# Patient Record
Sex: MALE | Race: WHITE | NOT HISPANIC OR LATINO | Employment: OTHER | URBAN - METROPOLITAN AREA
[De-identification: names, ages, dates, MRNs, and addresses within clinical notes are randomized per-mention and may not be internally consistent; named-entity substitution may affect disease eponyms.]

---

## 2017-12-23 ENCOUNTER — HOSPITAL ENCOUNTER (EMERGENCY)
Facility: HOSPITAL | Age: 77
Discharge: HOME | End: 2017-12-23
Attending: SURGERY
Payer: MEDICARE

## 2017-12-23 VITALS
HEIGHT: 72 IN | OXYGEN SATURATION: 97 % | TEMPERATURE: 96 F | BODY MASS INDEX: 35.21 KG/M2 | DIASTOLIC BLOOD PRESSURE: 82 MMHG | WEIGHT: 260 LBS | HEART RATE: 70 BPM | SYSTOLIC BLOOD PRESSURE: 141 MMHG | RESPIRATION RATE: 16 BRPM

## 2017-12-23 DIAGNOSIS — S82.042A CLOSED DISPLACED COMMINUTED FRACTURE OF LEFT PATELLA, INITIAL ENCOUNTER: Primary | ICD-10-CM

## 2017-12-23 PROCEDURE — 96372 THER/PROPH/DIAG INJ SC/IM: CPT

## 2017-12-23 PROCEDURE — 29505 APPLICATION LONG LEG SPLINT: CPT | Mod: LT

## 2017-12-23 PROCEDURE — 99283 EMERGENCY DEPT VISIT LOW MDM: CPT | Mod: 25

## 2017-12-23 PROCEDURE — 63600175 PHARM REV CODE 636 W HCPCS: Performed by: SURGERY

## 2017-12-23 RX ORDER — HYDROMORPHONE HYDROCHLORIDE 2 MG/ML
1 INJECTION, SOLUTION INTRAMUSCULAR; INTRAVENOUS; SUBCUTANEOUS
Status: COMPLETED | OUTPATIENT
Start: 2017-12-23 | End: 2017-12-23

## 2017-12-23 RX ORDER — HYDROCODONE BITARTRATE AND ACETAMINOPHEN 10; 325 MG/1; MG/1
1 TABLET ORAL EVERY 6 HOURS PRN
Qty: 20 TABLET | Refills: 0 | Status: SHIPPED | OUTPATIENT
Start: 2017-12-23 | End: 2018-01-02

## 2017-12-23 RX ORDER — ONDANSETRON 2 MG/ML
4 INJECTION INTRAMUSCULAR; INTRAVENOUS
Status: COMPLETED | OUTPATIENT
Start: 2017-12-23 | End: 2017-12-23

## 2017-12-23 RX ADMIN — ONDANSETRON 4 MG: 2 INJECTION INTRAMUSCULAR; INTRAVENOUS at 05:12

## 2017-12-23 RX ADMIN — HYDROMORPHONE HYDROCHLORIDE 1 MG: 2 INJECTION INTRAMUSCULAR; INTRAVENOUS; SUBCUTANEOUS at 05:12

## 2017-12-24 NOTE — ED PROVIDER NOTES
[facility]                                       December 23, 2017                   Chief Complaint  77 y.o. male with Leg Pain (left knee pain after fall)    History of Present Illness  Carl Hu presents to the emergency room with left knee pain noted today  Patient had a slip and fall earlier today with residual left knee pain afterwards  Patient on exam has bruising on the left knee area, pain on patellar exam now  X-ray shows a patella fracture, comminuted, good range of motion on ER exam   Patient has good distal pulses and capillary refill, neurovascular intact in the ER  Pt was extensively counseled and will follow up with an orthopedic, Colton Faust    The history is provided by the patient  Medical history: Arthritis, BPH, CAD, cancer, cataracts, COY, HLD, HTN  Surgeries: Heart catheterization, angioplasty, cosmetic surgical eye surgery  ALLERGIES: Statins    Review of Systems and Physical Exam     Review of Systems  -- Constitution - no fever, denies fatigue, no weakness, no chills  -- Eyes - no tearing or redness, no visual disturbance  -- Ear, Nose - no tinnitus or earache, no nasal congestion or discharge  -- Mouth,Throat - no sore throat, no toothache, normal voice, normal swallowing  -- Respiratory - denies cough and congestion, no shortness of breath, no COY  -- Cardiovascular - denies chest pain, no palpitations, denies claudication  -- Gastrointestinal - denies abdominal pain, nausea, vomiting, or diarrhea  -- Musculoskeletal - left knee pain  -- Neurological - no headache, denies weakness or seizure; no LOC  -- Skin - denies pallor, rash, or changes in skin. no hives or welts noted    Vital Signs  -- His blood pressure is 141/82 and his pulse is 70.   -- His respiration is 16 and oxygen saturation is 97%.      Physical Exam  -- Nursing note and vitals reviewed  -- Head: Atraumatic. Normocephalic. No obvious abnormality  -- Eyes: Pupils are equal and reactive to light.  Normal conjunctiva and lids  -- Cardiac: Normal rate, regular rhythm and normal heart sounds  -- Pulmonary: Normal respiratory effort, breath sounds clear to auscultation  -- Abdominal: Soft, no tenderness. Normal bowel sounds. Normal liver edge  -- Musculoskeletal: Left knee pain and swelling, pain on ROM today  -- Neurological: No focal deficits. Showed good interaction with staff  -- Vascular: Posterior tibial, dorsalis pedis and radial pulses 2+ bilaterally      Emergency Room Course     Treatment and Evaluation  -- Left knee x-ray shows a comminuted patella fracture  -- A knee immobilizer is placed on the affected knee by the CNA  -- Crutches were also given and taught for ambulation    -- IM 1 mg Dilaudid given today in the ER  -- IM 4 mg Zofran given today in the ER     Diagnosis  --  Closed displaced comminuted fracture of left patella, initial encounter.    Disposition and Plan  -- Disposition: home  -- Condition: stable  -- Follow-up: Patient to follow up with Orthopedics in 1-2 days.  -- I advised the patient that we have found no life threatening condition today  -- At this time, I believe the patient is clinically stable for discharge.   -- The patient acknowledges that close follow up with a MD is required   -- Patient agrees to comply with all instruction and direction given in the ER    This note is dictated on Dragon Natural Speaking word recognition program.  There are word recognition mistakes that are occasionally missed on review.           Abdiel Sapp MD  12/23/17 2023

## 2017-12-24 NOTE — ED NOTES
No s/s adverse reaction to medications given.  Discharge instructions and rx x1 given, patient and family voiced understanding.  Discharged to home in stable condition, transported to discharge window and then to POV via wheelchair, + neurovascular checks, NAD.

## 2017-12-26 ENCOUNTER — NURSE TRIAGE (OUTPATIENT)
Dept: ADMINISTRATIVE | Facility: CLINIC | Age: 77
End: 2017-12-26

## 2017-12-26 NOTE — TELEPHONE ENCOUNTER
"  Reason for Disposition   General information question, no triage required and triager able to answer question    Answer Assessment - Initial Assessment Questions  1. REASON FOR CALL or QUESTION: "What is your reason for calling today?" or "How can I best help you?" or "What question do you have that I can help answer?"      Pt had fall- broken patella. Wants to get medical records/ x-rays to ortho    Protocols used: ST INFORMATION ONLY CALL-A-AH    "

## 2018-02-16 ENCOUNTER — PATIENT MESSAGE (OUTPATIENT)
Dept: RESEARCH | Facility: HOSPITAL | Age: 78
End: 2018-02-16

## 2018-06-06 PROBLEM — I51.89 LEFT VENTRICULAR SYSTOLIC DYSFUNCTION, NYHA CLASS 2: Status: ACTIVE | Noted: 2018-06-06

## 2018-06-06 PROBLEM — I45.2 BUNDLE BRANCH BLOCK, RIGHT AND LEFT ANTERIOR FASCICULAR: Status: ACTIVE | Noted: 2018-06-06

## 2018-06-06 PROBLEM — I25.5 ISCHEMIC CARDIOMYOPATHY: Status: ACTIVE | Noted: 2018-06-06

## 2023-06-27 ENCOUNTER — LAB VISIT (OUTPATIENT)
Dept: LAB | Facility: HOSPITAL | Age: 83
End: 2023-06-27
Attending: NURSE PRACTITIONER
Payer: MEDICARE

## 2023-06-27 ENCOUNTER — OFFICE VISIT (OUTPATIENT)
Dept: NEUROLOGY | Facility: CLINIC | Age: 83
End: 2023-06-27
Payer: MEDICARE

## 2023-06-27 VITALS
RESPIRATION RATE: 16 BRPM | HEART RATE: 90 BPM | SYSTOLIC BLOOD PRESSURE: 118 MMHG | WEIGHT: 260.81 LBS | OXYGEN SATURATION: 96 % | DIASTOLIC BLOOD PRESSURE: 62 MMHG | BODY MASS INDEX: 36.51 KG/M2 | HEIGHT: 71 IN

## 2023-06-27 DIAGNOSIS — C44.90 SKIN CANCER: ICD-10-CM

## 2023-06-27 DIAGNOSIS — I10 HYPERTENSION, UNSPECIFIED TYPE: Chronic | ICD-10-CM

## 2023-06-27 DIAGNOSIS — I25.10 CORONARY ARTERY DISEASE, UNSPECIFIED VESSEL OR LESION TYPE, UNSPECIFIED WHETHER ANGINA PRESENT, UNSPECIFIED WHETHER NATIVE OR TRANSPLANTED HEART: Primary | ICD-10-CM

## 2023-06-27 DIAGNOSIS — I65.29 STENOSIS OF CAROTID ARTERY, UNSPECIFIED LATERALITY: ICD-10-CM

## 2023-06-27 DIAGNOSIS — L81.2 FRECKLE: ICD-10-CM

## 2023-06-27 DIAGNOSIS — E78.5 HYPERLIPIDEMIA, UNSPECIFIED HYPERLIPIDEMIA TYPE: Chronic | ICD-10-CM

## 2023-06-27 DIAGNOSIS — I25.2 HISTORY OF MI (MYOCARDIAL INFARCTION): ICD-10-CM

## 2023-06-27 DIAGNOSIS — R41.3 MEMORY LOSS: ICD-10-CM

## 2023-06-27 DIAGNOSIS — D49.6 BRAIN TUMOR: ICD-10-CM

## 2023-06-27 DIAGNOSIS — Z13.0 SCREENING FOR IRON DEFICIENCY ANEMIA: ICD-10-CM

## 2023-06-27 DIAGNOSIS — R53.83 FATIGUE, UNSPECIFIED TYPE: ICD-10-CM

## 2023-06-27 DIAGNOSIS — Z92.3 HX OF RADIATION THERAPY: ICD-10-CM

## 2023-06-27 DIAGNOSIS — G47.33 OSA (OBSTRUCTIVE SLEEP APNEA): ICD-10-CM

## 2023-06-27 LAB
ALBUMIN SERPL BCP-MCNC: 4 G/DL (ref 3.5–5.2)
ALP SERPL-CCNC: 101 U/L (ref 55–135)
ALT SERPL W/O P-5'-P-CCNC: 21 U/L (ref 10–44)
ANION GAP SERPL CALC-SCNC: 10 MMOL/L (ref 8–16)
AST SERPL-CCNC: 32 U/L (ref 10–40)
BASOPHILS # BLD AUTO: 0.04 K/UL (ref 0–0.2)
BASOPHILS NFR BLD: 0.6 % (ref 0–1.9)
BILIRUB SERPL-MCNC: 0.8 MG/DL (ref 0.1–1)
BUN SERPL-MCNC: 22 MG/DL (ref 8–23)
CALCIUM SERPL-MCNC: 9.4 MG/DL (ref 8.7–10.5)
CHLORIDE SERPL-SCNC: 103 MMOL/L (ref 95–110)
CO2 SERPL-SCNC: 24 MMOL/L (ref 23–29)
CREAT SERPL-MCNC: 1.3 MG/DL (ref 0.5–1.4)
DIFFERENTIAL METHOD: ABNORMAL
EOSINOPHIL # BLD AUTO: 0.1 K/UL (ref 0–0.5)
EOSINOPHIL NFR BLD: 1.9 % (ref 0–8)
ERYTHROCYTE [DISTWIDTH] IN BLOOD BY AUTOMATED COUNT: 11.6 % (ref 11.5–14.5)
EST. GFR  (NO RACE VARIABLE): 54 ML/MIN/1.73 M^2
FERRITIN SERPL-MCNC: 116 NG/ML (ref 20–300)
FOLATE SERPL-MCNC: 18.5 NG/ML (ref 4–24)
GLUCOSE SERPL-MCNC: 102 MG/DL (ref 70–110)
HCT VFR BLD AUTO: 41.1 % (ref 40–54)
HGB BLD-MCNC: 13.6 G/DL (ref 14–18)
IMM GRANULOCYTES # BLD AUTO: 0.02 K/UL (ref 0–0.04)
IMM GRANULOCYTES NFR BLD AUTO: 0.3 % (ref 0–0.5)
LYMPHOCYTES # BLD AUTO: 1.9 K/UL (ref 1–4.8)
LYMPHOCYTES NFR BLD: 29.9 % (ref 18–48)
MCH RBC QN AUTO: 30.8 PG (ref 27–31)
MCHC RBC AUTO-ENTMCNC: 33.1 G/DL (ref 32–36)
MCV RBC AUTO: 93 FL (ref 82–98)
MONOCYTES # BLD AUTO: 0.7 K/UL (ref 0.3–1)
MONOCYTES NFR BLD: 11.6 % (ref 4–15)
NEUTROPHILS # BLD AUTO: 3.5 K/UL (ref 1.8–7.7)
NEUTROPHILS NFR BLD: 55.7 % (ref 38–73)
NRBC BLD-RTO: 0 /100 WBC
PLATELET # BLD AUTO: 157 K/UL (ref 150–450)
PMV BLD AUTO: 8.9 FL (ref 9.2–12.9)
POTASSIUM SERPL-SCNC: 4.5 MMOL/L (ref 3.5–5.1)
PROT SERPL-MCNC: 7.2 G/DL (ref 6–8.4)
RBC # BLD AUTO: 4.42 M/UL (ref 4.6–6.2)
SODIUM SERPL-SCNC: 137 MMOL/L (ref 136–145)
T4 SERPL-MCNC: 8.7 UG/DL (ref 4.5–11.5)
TSH SERPL DL<=0.005 MIU/L-ACNC: 1.44 UIU/ML (ref 0.4–4)
VIT B12 SERPL-MCNC: 680 PG/ML (ref 210–950)
WBC # BLD AUTO: 6.36 K/UL (ref 3.9–12.7)

## 2023-06-27 PROCEDURE — 85025 COMPLETE CBC W/AUTO DIFF WBC: CPT | Performed by: NURSE PRACTITIONER

## 2023-06-27 PROCEDURE — 82746 ASSAY OF FOLIC ACID SERUM: CPT | Performed by: NURSE PRACTITIONER

## 2023-06-27 PROCEDURE — 36415 COLL VENOUS BLD VENIPUNCTURE: CPT | Performed by: NURSE PRACTITIONER

## 2023-06-27 PROCEDURE — 86592 SYPHILIS TEST NON-TREP QUAL: CPT | Performed by: NURSE PRACTITIONER

## 2023-06-27 PROCEDURE — 99999 PR STA SHADOW: CPT | Mod: PBBFAC,,, | Performed by: NURSE PRACTITIONER

## 2023-06-27 PROCEDURE — 99205 OFFICE O/P NEW HI 60 MIN: CPT | Mod: S$PBB | Performed by: NURSE PRACTITIONER

## 2023-06-27 PROCEDURE — 99999 PR PBB SHADOW E&M-EST. PATIENT-LVL IV: ICD-10-PCS | Mod: PBBFAC,,, | Performed by: NURSE PRACTITIONER

## 2023-06-27 PROCEDURE — 84443 ASSAY THYROID STIM HORMONE: CPT | Performed by: NURSE PRACTITIONER

## 2023-06-27 PROCEDURE — 99214 OFFICE O/P EST MOD 30 MIN: CPT | Mod: PBBFAC | Performed by: NURSE PRACTITIONER

## 2023-06-27 PROCEDURE — 82607 VITAMIN B-12: CPT | Performed by: NURSE PRACTITIONER

## 2023-06-27 PROCEDURE — 80053 COMPREHEN METABOLIC PANEL: CPT | Performed by: NURSE PRACTITIONER

## 2023-06-27 PROCEDURE — 84436 ASSAY OF TOTAL THYROXINE: CPT | Performed by: NURSE PRACTITIONER

## 2023-06-27 PROCEDURE — 99999 PR PBB SHADOW E&M-EST. PATIENT-LVL IV: CPT | Mod: PBBFAC,,, | Performed by: NURSE PRACTITIONER

## 2023-06-27 PROCEDURE — 82728 ASSAY OF FERRITIN: CPT | Performed by: NURSE PRACTITIONER

## 2023-06-27 RX ORDER — LOSARTAN POTASSIUM 25 MG/1
25 TABLET ORAL DAILY
COMMUNITY
Start: 2023-04-22

## 2023-06-27 RX ORDER — FUROSEMIDE 20 MG/1
20 TABLET ORAL 2 TIMES DAILY
COMMUNITY
Start: 2023-04-22

## 2023-06-27 RX ORDER — PANTOPRAZOLE SODIUM 40 MG/1
40 TABLET, DELAYED RELEASE ORAL DAILY
COMMUNITY
Start: 2023-04-10

## 2023-06-27 RX ORDER — TRIAMCINOLONE ACETONIDE 1 MG/G
CREAM TOPICAL
COMMUNITY
Start: 2023-06-23

## 2023-06-27 RX ORDER — TEMAZEPAM 15 MG/1
15 CAPSULE ORAL NIGHTLY PRN
COMMUNITY
Start: 2023-06-24 | End: 2024-02-28

## 2023-06-27 RX ORDER — MUPIROCIN 20 MG/G
OINTMENT TOPICAL 3 TIMES DAILY PRN
COMMUNITY
Start: 2023-06-24

## 2023-06-27 RX ORDER — EVOLOCUMAB 140 MG/ML
INJECTION, SOLUTION SUBCUTANEOUS
COMMUNITY
Start: 2023-06-24
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
## 2023-06-28 LAB — RPR SER QL: NORMAL

## 2023-07-05 ENCOUNTER — DOCUMENTATION ONLY (OUTPATIENT)
Dept: NEUROLOGY | Facility: CLINIC | Age: 83
End: 2023-07-05
Payer: COMMERCIAL

## 2023-07-05 NOTE — PROGRESS NOTES
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     MRI Brain with and without contrast in 4/2023 showed a metastatic lesion within the cortex of the medial left occipital lobe, measuring 9 x 12 mm.     Review of records from [facility]:   Echo 5/2023 showed an EF of 35%, PAP 26 mmHg, pacemaker lead present. Other findings were mild.

## 2023-08-17 ENCOUNTER — TELEPHONE (OUTPATIENT)
Dept: NEUROLOGY | Facility: CLINIC | Age: 83
End: 2023-08-17
Payer: COMMERCIAL

## 2023-08-17 NOTE — TELEPHONE ENCOUNTER
"Patient states he had an updated MRI 3 weeks ago - he did receive a disk with the results for Nikki to review when he makes his appointment. Mentioned that NP does not have to worry as the tumor has reduced to 1/3 of the original size. Mentioned that this was done at [facility].    His Memory complaints:  States he has stopped himself "Cold-Turkey" from his sleeping pills and pain pills. He believes that was attributing to his memory issues. It has been two nights so far that he has stopped.    States he is also no longer needing radiation - therefore he is trying to "heal" himself back to normalcy.   "

## 2023-08-17 NOTE — TELEPHONE ENCOUNTER
Is there a way for us to get the paper report of the MRI Brain that was recently done on this patient? I have a note stating that his memory is worse lately. How is it worse? Is he still driving?

## 2024-01-24 ENCOUNTER — TELEPHONE (OUTPATIENT)
Dept: NEUROLOGY | Facility: CLINIC | Age: 84
End: 2024-01-24
Payer: MEDICARE

## 2024-01-24 NOTE — TELEPHONE ENCOUNTER
----- Message from Jamila Cardona LPN sent at 2024  5:06 PM CST -----  Contact: PATIENT  Attempted to contact, left a message on voicemail to return call.  ----- Message -----  From: Eneida Diaz  Sent: 2024   4:31 PM CST  To: [facility] Staff    Carl Hu  MRN: [id number]  : 1940  PCP: Neville Calix  Home Phone      [phone]  Work Phone      Not on file.  Mobile          [phone]  Home Phone      Not on file.  Mobile          Not on file.      MESSAGE: Patient would like to make a follow up appointment with Nikki, he cancelled his last appointment in September.  The last appointment was for an hour.        Phone: [phone]

## 2024-02-28 ENCOUNTER — OFFICE VISIT (OUTPATIENT)
Dept: NEUROLOGY | Facility: CLINIC | Age: 84
End: 2024-02-28
Payer: MEDICARE

## 2024-02-28 VITALS
HEIGHT: 71 IN | WEIGHT: 255.75 LBS | BODY MASS INDEX: 35.81 KG/M2 | DIASTOLIC BLOOD PRESSURE: 62 MMHG | SYSTOLIC BLOOD PRESSURE: 118 MMHG

## 2024-02-28 DIAGNOSIS — D49.6 BRAIN TUMOR: Primary | ICD-10-CM

## 2024-02-28 DIAGNOSIS — C79.31 MALIGNANT MELANOMA METASTATIC TO BRAIN: ICD-10-CM

## 2024-02-28 DIAGNOSIS — G47.00 INSOMNIA, UNSPECIFIED TYPE: ICD-10-CM

## 2024-02-28 DIAGNOSIS — R41.3 MEMORY LOSS: ICD-10-CM

## 2024-02-28 DIAGNOSIS — Z92.3 HX OF RADIATION THERAPY: ICD-10-CM

## 2024-02-28 DIAGNOSIS — G47.33 OSA (OBSTRUCTIVE SLEEP APNEA): ICD-10-CM

## 2024-02-28 DIAGNOSIS — C44.90 SKIN CANCER: ICD-10-CM

## 2024-02-28 PROCEDURE — 99999 PR PBB SHADOW E&M-EST. PATIENT-LVL V: CPT | Mod: PBBFAC,,, | Performed by: NURSE PRACTITIONER

## 2024-02-28 PROCEDURE — 99215 OFFICE O/P EST HI 40 MIN: CPT | Mod: S$PBB | Performed by: NURSE PRACTITIONER

## 2024-02-28 PROCEDURE — 99215 OFFICE O/P EST HI 40 MIN: CPT | Mod: PBBFAC | Performed by: NURSE PRACTITIONER

## 2024-02-28 PROCEDURE — 99999 PR STA SHADOW: CPT | Mod: PBBFAC,,, | Performed by: NURSE PRACTITIONER

## 2024-02-28 RX ORDER — AMIODARONE HYDROCHLORIDE 200 MG/1
200 TABLET ORAL
COMMUNITY
Start: 2023-11-30 | End: 2024-06-04

## 2024-02-28 RX ORDER — TRAMADOL HYDROCHLORIDE 50 MG/1
50 TABLET ORAL EVERY 6 HOURS PRN
COMMUNITY
Start: 2024-02-06 | End: 2024-04-05

## 2024-02-28 RX ORDER — GABAPENTIN 100 MG/1
100 CAPSULE ORAL 2 TIMES DAILY
COMMUNITY
Start: 2024-02-06 | End: 2024-04-05

## 2024-02-28 RX ORDER — TRAZODONE HYDROCHLORIDE 50 MG/1
50 TABLET ORAL NIGHTLY
Qty: 30 TABLET | Refills: 5 | Status: SHIPPED | OUTPATIENT
Start: 2024-02-28 | End: 2025-02-27

## 2024-02-28 NOTE — PROGRESS NOTES
"Subjective:      Chief Complaint:  No chief complaint on file.      History of Present Illness  Carl Hu is a 83 y.o. 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 He has had radiation therapy for both. He has HTN and HLD, CECILE, as well as a history of lumbar radicular pain (evaluated in clinic in 2016 for this). History of surgery to right knee and left knee injury.     He presents today for a follow up visit. He was evaluated for memory loss at his last visit in 6/2023. He had recently undergone radiation therapy for melanoma, with brain lesion. He was also experiencing a great deal of financial and personal stress with his ill wife, who has passed since his last visit.     Labs done at his last visit were unremarkable.     He feels that his memory is worse since his last visit. He is having issues with using his iphone when trying to do "creative things". There are no other examples currently.     He uses breathing exercises and yoga to help with stress, which is helpful. He feels that he is grieving his wife's death appropriately.     He had a repeat MRI Brain done in 2/2024. The results of which are suspicious for metastasis to the left frontal lobe. Oncology was not sure that this was metastasis per patient, and their plan is to repeat the MRI in 6 weeks to evaluate for changes before considering further radiation. He is on Keytruda immunotherapy.     He is interested in seeking a second opinion from an Oncologist who specializes in brain cancer.     History is difficult, as I am not able to ask patient HPI questions, due to tangential speech.     He is still taking Temazepam for insomnia, but can only tolerate taking this 2-3 nights per week, secondary to fatigue and cognitive slowing the following day.     He is driving well without issue. No trouble with ADL's.     I have reviewed " all of this patient's past medical and surgical histories as well as family and social histories and active allergies and medications as documented in the electronic medical record.    Review of Systems  Review of Systems   Constitutional:  Positive for appetite change and fatigue. Negative for activity change, fever and unexpected weight change.   HENT:  Positive for hearing loss. Negative for congestion, drooling, ear pain, mouth sores, sinus pressure, tinnitus, trouble swallowing and voice change.    Eyes:  Positive for visual disturbance. Negative for photophobia.        Blurred vision at times   Respiratory:  Positive for shortness of breath. Negative for apnea and choking.    Cardiovascular:  Positive for leg swelling. Negative for chest pain and palpitations.   Gastrointestinal:  Negative for constipation, diarrhea, nausea and vomiting.   Endocrine: Negative.    Genitourinary:  Negative for dysuria.   Musculoskeletal:  Positive for back pain and neck stiffness. Negative for arthralgias, gait problem, joint swelling, myalgias and neck pain.        He has had neck stiffness since having two skin cancer excisions   Skin:  Positive for wound. Negative for rash.   Neurological:  Negative for dizziness, tremors, seizures, syncope, facial asymmetry, speech difficulty, weakness, light-headedness, numbness and headaches.   Hematological:  Does not bruise/bleed easily.   Psychiatric/Behavioral:  Negative for agitation, behavioral problems, confusion, decreased concentration, dysphoric mood, hallucinations, sleep disturbance and suicidal ideas. The patient is not nervous/anxious.        Objective:       There were no vitals filed for this visit.    Exam:  Gen Appearance, well developed/nourished in no apparent distress  CV: 2+ distal pulses with no edema or swelling  Neuro:  MS: Awake, alert, oriented to place, person, time, situation. Sustains attention. Recent/remote memory intact, Language is full to spontaneous  speech/repetition/naming/comprehension. Fund of Knowledge is full. Speech is tangential, which is his baseline. Interrupts examiner frequently throughout visit. Clock drawing is excellent today.   CN: PERRL, Extraoccular movements and visual fields are full. Normal facial sensation and strength, Hearing symmetric, Tongue and Palate are midline and strong. Shoulder Shrug symmetric and strong.  Motor: Normal bulk, tone, no abnormal movements. 5/5 strength bilateral upper/lower extremities with 2+ reflexes and bilateral plantar response  Sensory: symmetric to light touch, pain, temp, and vibration/proprioception. Romberg negative  Cerebellar: Finger-nose, Heal-shin, Rapid alternating movements intact  Gait: Normal stance, no ataxia, but gait is arthralgic  Skin: healing wound to neck from skin cancer    Imaging:  MRI Brain 2/2024:  There has been apparent development of some tiny enhancing foci scattered throughout the brain parenchyma consistent with developing metastasis. These are less than 3 mm, and are too small to be definitively characterized.     MRI Brain with and without contrast in 4/2023 showed a metastatic lesion within the cortex of the medial left occipital lobe, measuring 9 x 12 mm.      Review of records from [facility]:   Echo 5/2023 showed an EF of 35%, PAP 26 mmHg, pacemaker lead present. Other findings were mild.     5/2023 Echo per [facility]:   EF 35%    2016 MRI L-spine done at [facility] (in media):   Mild stenosis at multiple levels    Labs:  6/2023 CBC, CMP, TSh, T4, B12, Ferritin, folate, RPR overall unremarkable    Assessment:      1. Brain tumor    2. Memory loss    3. CECILE (obstructive sleep apnea)    4. Skin cancer    5. Hx of radiation therapy        Plan:   I recommend:   MRI Brain with and without contrast in 4/2023 showed a metastatic lesion within the cortex of the medial left occipital lobe, measuring 9 x 12 mm.   -MRI Brain update in 2/2024 suggested metastasis; however, [facility] isn't  convinced and plans to repeat his MRI in 6 weeks before considering further radiation.   -he is interested in having a second opinion with an Oncologist who specializes in brain cancer-will try to coordinate referral.     -His skin cancer is being managed by both Dr. Dolan, Dr. Merino, and [facility].  -currently receiving radiation for his skin cancer.     2. He has short term memory complaints, which began around the time of his skin cancer/brain tumor diagnosis. Possible contributions are situational anxiety/depression, untreated CECILE (no longer using CPAP), heart failure/reduced EF, recent radiation therapy, temazepam use, and brain tumor treated with radiation.   -labs to evaluate for systemic cause of his memory loss were unremarkable.     -will order Neuropsych testing to quantify memory loss. Orders sent to [facility].     3. He takes Tylenol prn for chronic lumbar pain. He tried chiropractic care and PT prior.     4. He uses an OTC topical agent for neuropathic pain to his feet.     5. Obtain records from PCP regarding an antidepressant that he was prescribed, which worsened his depression.     STOP Temazepam, as this can cause memory loss/cognitive slowing.   -he failed Trazodone for insomnia prior, as well as Melatonin. Will try second trial of Trazodone. If this is ineffective, we could consider Doxepin.   -he declines counseling, as he does not feel this would be effective.     6. He is followed by Cardiology-Dr. Anthony. He reports Carotid stenosis, not requiring surgery.     7. He is unable restart CPAP therapy for his CECILE currently, due to inability to wear headgear over his skin cancer/wound.   -advised to speak with ENT/Dr. Merino about other options to address his sleep apnea, as not treating this can contribute to memory loss and fatigue.     FU 3 months/will call with results and any changes to the POC    Patient will need ONE HOUR visits    66 minutes of total time spent on the encounter,  which includes face to face time and non-face to face time preparing to see the patient (eg, review of tests), Obtaining and/or reviewing separately obtained history, Documenting clinical information in the electronic or other health record, Independently interpreting results (not separately reported) and communicating results to the patient/family/caregiver, or Care coordination (not separately reported).

## 2024-02-28 NOTE — PATIENT INSTRUCTIONS
We will independently review your MRI Brain and contact you an our opinion on the results.     We will call [facility] to get the name of the Oncologist who specializes in brain cancer.     Please call clinic in one week and let me know how the Trazodone is working.

## 2024-02-29 ENCOUNTER — TELEPHONE (OUTPATIENT)
Dept: NEUROLOGY | Facility: CLINIC | Age: 84
End: 2024-02-29
Payer: MEDICARE

## 2024-02-29 NOTE — TELEPHONE ENCOUNTER
Please let patient know that Dr. Granger and I reviewed his MRI's, including the most recent. It does appear that there is a very small change to the left frontal lobe; however, because it is so small, it is difficult to determine if this is related to his melanoma or something else. She agreed with the plan per Oncology to reimage in 6 weeks to look for growth.     We did place a referral to Oncology at [facility] for a second opinion per his request.

## 2024-02-29 NOTE — TELEPHONE ENCOUNTER
"Patient notified, voiced understanding. Patient states "playing phone tag" with psychological testing office, will use patient portal to communicate updates until next visit with Nikki Arora NP, started trazodone last night and will send update after seven days of medication.   "

## 2024-03-03 ENCOUNTER — PATIENT MESSAGE (OUTPATIENT)
Dept: NEUROLOGY | Facility: CLINIC | Age: 84
End: 2024-03-03
Payer: MEDICARE

## 2024-03-05 ENCOUNTER — PATIENT MESSAGE (OUTPATIENT)
Dept: NEUROLOGY | Facility: CLINIC | Age: 84
End: 2024-03-05
Payer: MEDICARE

## 2024-03-05 NOTE — TELEPHONE ENCOUNTER
I have not evaluated the patient for these complaints. Unfortunately, I can't prescribe for something that I haven't addressed in clinic. If he has another provider that has addressed this complaint for him, his other provider can order the topical cream from [facility] in [place]. If he wants me to address this, I would need to evaluate this new Neuro type complaint in clinic.

## 2024-03-09 ENCOUNTER — PATIENT MESSAGE (OUTPATIENT)
Dept: NEUROLOGY | Facility: CLINIC | Age: 84
End: 2024-03-09
Payer: MEDICARE

## 2024-03-15 ENCOUNTER — TELEPHONE (OUTPATIENT)
Dept: HEMATOLOGY/ONCOLOGY | Facility: CLINIC | Age: 84
End: 2024-03-15
Payer: MEDICARE

## 2024-03-15 DIAGNOSIS — D49.6 BRAIN TUMOR: Primary | ICD-10-CM

## 2024-03-18 ENCOUNTER — TELEPHONE (OUTPATIENT)
Dept: HEMATOLOGY/ONCOLOGY | Facility: CLINIC | Age: 84
End: 2024-03-18
Payer: MEDICARE

## 2024-03-18 ENCOUNTER — TELEPHONE (OUTPATIENT)
Dept: NEUROSURGERY | Facility: CLINIC | Age: 84
End: 2024-03-18
Payer: MEDICARE

## 2024-03-18 NOTE — TELEPHONE ENCOUNTER
Notified of Pt request for evaluation by neuro-oncologist, Dr. Nava at [facility] [place] [place] for brain mets.     Attempted to reach out to Pt to discuss further. LVM with direct call back info.

## 2024-03-19 ENCOUNTER — TELEPHONE (OUTPATIENT)
Dept: NEUROSURGERY | Facility: CLINIC | Age: 84
End: 2024-03-19
Payer: MEDICARE

## 2024-03-19 ENCOUNTER — TELEPHONE (OUTPATIENT)
Dept: HEMATOLOGY/ONCOLOGY | Facility: CLINIC | Age: 84
End: 2024-03-19
Payer: MEDICARE

## 2024-03-19 DIAGNOSIS — C44.90 SKIN CANCER: Primary | ICD-10-CM

## 2024-03-19 NOTE — TELEPHONE ENCOUNTER
----- Message from Linnea Ochoa sent at 3/19/2024 12:26 PM CDT -----  Regarding: pt advice  Contact: [phone]  Pt returning call from staff in regards to scheduling. Pls call to discuss.

## 2024-03-19 NOTE — TELEPHONE ENCOUNTER
Returned phone call to Pt.     Accepted appt with Dr. Nava for 4/4 at 2 pm and also agreeable to seeing Dr. Altman 4/4 at 3 pm.     Directions to clinic provided. Contact info reviewed with Pt. All questions/concerns addressed at this time.

## 2024-04-04 ENCOUNTER — OFFICE VISIT (OUTPATIENT)
Dept: NEUROLOGY | Facility: CLINIC | Age: 84
End: 2024-04-04
Payer: MEDICARE

## 2024-04-04 ENCOUNTER — OFFICE VISIT (OUTPATIENT)
Dept: HEMATOLOGY/ONCOLOGY | Facility: CLINIC | Age: 84
End: 2024-04-04
Payer: MEDICARE

## 2024-04-04 ENCOUNTER — HOSPITAL ENCOUNTER (OUTPATIENT)
Dept: RADIOLOGY | Facility: HOSPITAL | Age: 84
Discharge: HOME | End: 2024-04-04
Attending: INTERNAL MEDICINE
Payer: MEDICARE

## 2024-04-04 VITALS
WEIGHT: 248 LBS | HEART RATE: 92 BPM | HEIGHT: 71 IN | OXYGEN SATURATION: 96 % | SYSTOLIC BLOOD PRESSURE: 122 MMHG | RESPIRATION RATE: 16 BRPM | BODY MASS INDEX: 34.72 KG/M2 | DIASTOLIC BLOOD PRESSURE: 77 MMHG

## 2024-04-04 VITALS
SYSTOLIC BLOOD PRESSURE: 125 MMHG | BODY MASS INDEX: 34.64 KG/M2 | WEIGHT: 247.44 LBS | DIASTOLIC BLOOD PRESSURE: 84 MMHG | HEART RATE: 88 BPM | HEIGHT: 71 IN

## 2024-04-04 DIAGNOSIS — R19.7 DIARRHEA, UNSPECIFIED TYPE: ICD-10-CM

## 2024-04-04 DIAGNOSIS — C44.90 SKIN CANCER: ICD-10-CM

## 2024-04-04 DIAGNOSIS — C79.31 METASTASIS TO BRAIN: ICD-10-CM

## 2024-04-04 DIAGNOSIS — C43.9 METASTATIC MELANOMA: Primary | ICD-10-CM

## 2024-04-04 DIAGNOSIS — C79.31 MALIGNANT MELANOMA METASTATIC TO BRAIN: Primary | ICD-10-CM

## 2024-04-04 DIAGNOSIS — I10 HYPERTENSION, UNSPECIFIED TYPE: Chronic | ICD-10-CM

## 2024-04-04 DIAGNOSIS — N40.0 BENIGN PROSTATIC HYPERPLASIA, UNSPECIFIED WHETHER LOWER URINARY TRACT SYMPTOMS PRESENT: Chronic | ICD-10-CM

## 2024-04-04 DIAGNOSIS — K52.9 COLITIS: ICD-10-CM

## 2024-04-04 DIAGNOSIS — E78.5 HYPERLIPIDEMIA, UNSPECIFIED HYPERLIPIDEMIA TYPE: Chronic | ICD-10-CM

## 2024-04-04 DIAGNOSIS — I25.10 CORONARY ARTERY DISEASE, UNSPECIFIED VESSEL OR LESION TYPE, UNSPECIFIED WHETHER ANGINA PRESENT, UNSPECIFIED WHETHER NATIVE OR TRANSPLANTED HEART: ICD-10-CM

## 2024-04-04 LAB — POCT GLUCOSE: 103 MG/DL (ref 70–110)

## 2024-04-04 PROCEDURE — 99999 PR PBB SHADOW E&M-EST. PATIENT-LVL III: CPT | Mod: PBBFAC,,, | Performed by: PSYCHIATRY & NEUROLOGY

## 2024-04-04 PROCEDURE — 99215 OFFICE O/P EST HI 40 MIN: CPT | Mod: S$PBB,,, | Performed by: PSYCHIATRY & NEUROLOGY

## 2024-04-04 PROCEDURE — A9552 F18 FDG: HCPCS | Performed by: INTERNAL MEDICINE

## 2024-04-04 PROCEDURE — 99999 PR PBB SHADOW E&M-EST. PATIENT-LVL V: CPT | Mod: PBBFAC,,, | Performed by: INTERNAL MEDICINE

## 2024-04-04 PROCEDURE — 78816 PET IMAGE W/CT FULL BODY: CPT | Mod: TC

## 2024-04-04 PROCEDURE — 99215 OFFICE O/P EST HI 40 MIN: CPT | Mod: PBBFAC,25 | Performed by: INTERNAL MEDICINE

## 2024-04-04 PROCEDURE — 99213 OFFICE O/P EST LOW 20 MIN: CPT | Mod: PBBFAC,25,27 | Performed by: PSYCHIATRY & NEUROLOGY

## 2024-04-04 PROCEDURE — G2211 COMPLEX E/M VISIT ADD ON: HCPCS | Mod: S$PBB,,, | Performed by: PSYCHIATRY & NEUROLOGY

## 2024-04-04 PROCEDURE — 99205 OFFICE O/P NEW HI 60 MIN: CPT | Mod: S$PBB,,, | Performed by: INTERNAL MEDICINE

## 2024-04-04 PROCEDURE — 78816 PET IMAGE W/CT FULL BODY: CPT | Mod: 26,PI,, | Performed by: STUDENT IN AN ORGANIZED HEALTH CARE EDUCATION/TRAINING PROGRAM

## 2024-04-04 RX ORDER — DIPHENOXYLATE HYDROCHLORIDE AND ATROPINE SULFATE 2.5; .025 MG/1; MG/1
1 TABLET ORAL 4 TIMES DAILY PRN
COMMUNITY
End: 2024-06-04

## 2024-04-04 RX ORDER — EVOLOCUMAB 140 MG/ML
INJECTION, SOLUTION SUBCUTANEOUS
COMMUNITY
End: 2024-06-04

## 2024-04-04 RX ORDER — PREDNISONE 20 MG/1
TABLET ORAL
Qty: 76 TABLET | Refills: 0 | Status: SHIPPED | OUTPATIENT
Start: 2024-04-04 | End: 2024-06-04

## 2024-04-04 RX ORDER — FLUDEOXYGLUCOSE F18 500 MCI/ML
12 INJECTION INTRAVENOUS ONCE
Status: COMPLETED | OUTPATIENT
Start: 2024-04-04 | End: 2024-04-04

## 2024-04-04 RX ORDER — ONDANSETRON 4 MG/1
4 TABLET, FILM COATED ORAL EVERY 8 HOURS PRN
COMMUNITY
End: 2024-06-04

## 2024-04-04 RX ADMIN — FLUDEOXYGLUCOSE F-18 14.12 MILLICURIE: 500 INJECTION INTRAVENOUS at 11:04

## 2024-04-04 NOTE — Clinical Note
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 He is not happy with his local dermatologist. Could you get him in your clinic for skin checks?  Thanks Garrick

## 2024-04-04 NOTE — Clinical Note
Mandie,  I wanted to let you all aware of this patient. I am not treating him, he is being treated in [place]. He came for a second opinion for melanoma. He is on pembrolizumab and for 3 weeks now has had diarrhea unfortunately mismanaged by local oncologist. He has just been recommended to take lomotil and imodium. Despite anti-diarrheals he now has worsening diarrhea, grade 3. I have collected stool studies and initial workup. He still wants to be treated locally.  Dr. Hoskins will follow up with him to see if his diarrhea has resolved on steroid taper that I sent in a few days. He is ok for now to stop the immunotherapy, but he is at high risk for recurrence. I just wanted him on your radar in case his melanoma recurs, we would need to re-challenge with combination IO and likely would need a biologic to re-challenge. Coordinating a scope in [place] might be a challenge.  Thanks Garrick

## 2024-04-04 NOTE — PROGRESS NOTES
Subjective:       Patient ID: Carl Hu is a 83 y.o. male.    Chief Complaint: Consult    HPI  Oncologic History  3/2/2023: resection of posterior scalp lesion with pathology consistent with lentigo malignant melanoma, PD-L1 positive, BRAF-wildtype. [place] lymph node biopsy was negative.  Pathology:  PD-L1 positive (CPS = 20, TPS < 10/0),  TMB-HIGH (70.1), LUIS/MSI-LOW, NRAS p.G125 mutation positive, SETD2 p.  mutation positive, TSC1 p.Q109 mutation positive, ARID2 p. mutation  positive, ARID2 p.12.1754 mutation positive, NF1 p.[id number] mutation  positive, TERT p.? mutation positive, BRAF/CDKN2A/KIT/NTRK wild-type, HRD negative  4/4/2023: extensive lateral margin re-excision with negative margins  4/13/2023: staging PET with no evidence of disease  4/20/2023: staging MRI of the brain showed a 9x12mm left occipital lobe lesion consistent with metastasis  5/18/2023: SRS to 24Gy (1fx)  6/14/2023-present: adjuvant pembrolizumab IV 200mg q3 weeks (plan for 1 year)  6/14-7/28/2023: radiation therapy to left posterior scalp to 60Gy over 30fx  8/1/2023: surveillance MRI of the brain showed partial response to treatment  11/2023: re-staging PET showed no evidence of disease  2/2023: re-staging MRI of the brain showed no evidence of disease (radiology report mentions multiple punctate enhancing lesions in the left frontal lobe; I do not appreciate these on my imaging review)    Medical oncologist: Dr. Chi Hoskins ([facility])  Radiation oncologist: Dr. Kyra Fagan    Mr. Hu presents today with his daughter, who helps provide the history. Currently, his main complaint is nausea/vomiting and diarrhea that have been present for the past several weeks. He was recently prescribed medication for this but has not yet started it. He has otherwise tolerated his above treatments very well. He denies visual problems. He endorses some cognitive slowing, for which he sees a local neurologist.    Past  Medical History:   Diagnosis Date    Arthritis     Asthma     BPH (benign prostatic hyperplasia) 04/08/2013    BPH (benign prostatic hyperplasia)     CAD (coronary artery disease) 04/08/2013    Cancer     Carotid stenosis 6/27/2023    Cataracts, bilateral 04/08/2013    COY (dyspnea on exertion) 04/08/2013    Encounter for blood transfusion     Erectile dysfunction     GERD (gastroesophageal reflux disease)     Hyperlipidemia 04/08/2013    Hypertension 04/08/2013    Ischemic cardiomyopathy     Kidney stone     Memory loss     MI (myocardial infarction) 01/2018    CECILE (obstructive sleep apnea) 6/27/2023    Other secondary pulmonary hypertension     Pericarditis     Shingles     Statin intolerance       Current Outpatient Medications   Medication Sig Dispense Refill    amiodarone (PACERONE) 200 MG Tab Take 200 mg by mouth.      aspirin (ECOTRIN) 81 MG EC tablet Take 81 mg by mouth once daily.      CALCIUM CARBONATE/VITAMIN D3 (VITAMIN D-3 ORAL) Take 2,000 mg by mouth once daily.      clopidogrel (PLAVIX) 75 mg tablet Take 75 mg by mouth once daily.      coenzyme Q10 100 mg capsule Take 100 mg by mouth once daily.      diphenoxylate-atropine 2.5-0.025 mg (LOMOTIL) 2.5-0.025 mg per tablet Take 1 tablet by mouth 4 (four) times daily as needed for Diarrhea.      evolocumab (REPATHA SYRINGE) 140 mg/mL Syrg Inject into the skin.      fish oil/borage/flax/om3,6,9 1 (OMEGA 3-6-9 ORAL) Take 2 tablets by mouth once daily. OMEGA XL      furosemide (LASIX) 20 MG tablet Take 20 mg by mouth 2 (two) times daily.      metoprolol succinate (TOPROL-XL) 50 MG 24 hr tablet Take 50 mg by mouth 2 (two) times daily.      nitroGLYCERIN (NITROSTAT) 0.4 MG SL tablet Place 0.4 mg under the tongue every 5 (five) minutes as needed.      ondansetron (ZOFRAN) 4 MG tablet Take 4 mg by mouth every 8 (eight) hours as needed for Nausea.      pantoprazole (PROTONIX) 40 MG tablet Take 40 mg by mouth once daily.      pembrolizumab (KEYTRUDA) 25 mg/mL Soln  Inject into the vein.      spironolactone (ALDACTONE) 25 MG tablet Take 12.5 mg by mouth once daily.      traZODone (DESYREL) 50 MG tablet Take 1 tablet (50 mg total) by mouth every evening. 30 tablet 5    turmeric (CURCUMIN MISC) by Misc.(Non-Drug; Combo Route) route.      predniSONE (DELTASONE) 20 MG tablet Take 4 tablets (80 mg total) by mouth once daily for 5 days, THEN 3.5 tablets (70 mg total) once daily for 4 days, THEN 3 tablets (60 mg total) once daily for 4 days, THEN 2.5 tablets (50 mg total) once daily for 4 days, THEN 2 tablets (40 mg total) once daily for 4 days, THEN 1.5 tablets (30 mg total) once daily for 4 days, THEN 1 tablet (20 mg total) once daily for 4 days, THEN 0.5 tablets (10 mg total) once daily for 4 days. 76 tablet 0    REPATHA SURECLICK 140 mg/mL PnIj Inject into the skin every 30 days.      traMADoL (ULTRAM) 50 mg tablet Take 50 mg by mouth every 6 (six) hours as needed.      triamcinolone acetonide 0.1% (KENALOG) 0.1 % cream Apply topically as needed.       No current facility-administered medications for this visit.      Past Surgical History:   Procedure Laterality Date    CARDIAC CATHETERIZATION      CATARACT EXTRACTION W/ INTRAOCULAR LENS  IMPLANT, BILATERAL      COLONOSCOPY W/ POLYPECTOMY      CORONARY ANGIOPLASTY      CORONARY ANGIOPLASTY WITH STENT PLACEMENT      x2    COSMETIC SURGERY      EYE SURGERY      INSERTION OF BIVENTRICULAR IMPLANTABLE CARDIOVERTER-DEFIBRILLATOR (ICD) N/A 6/6/2018    Procedure: Implant ICD - bi ventricular;  Surgeon: Robert Weber MD;  Location: [facility];  Service: Cardiovascular;  Laterality: N/A;    KNEE ARTHROPLASTY Right     skin cancer removal      TOE SURGERY Right     nerve removal    TONSILLECTOMY        Family History   Problem Relation Age of Onset    Hyperlipidemia Mother     Stroke Mother     Hyperlipidemia Father     Heart attack Father     Stroke Maternal Grandfather     Anemia Neg Hx     Arrhythmia Neg Hx     Asthma Neg Hx      Clotting disorder Neg Hx     Fainting Neg Hx     Heart disease Neg Hx     Heart failure Neg Hx     Hypertension Neg Hx       Social History     Tobacco Use    Smoking status: Former     Current packs/day: 0.00     Types: Cigarettes     Quit date: 1979     Years since quittin.0    Smokeless tobacco: Never   Substance Use Topics    Alcohol use: No    Drug use: No      Review of Systems  KPS: 90      Objective:      Vitals:    24 1412   BP: 125/84   Pulse: 88        NEUROLOGICAL EXAMINATION:     MENTAL STATUS   Attention: normal. Concentration: normal.   Speech: speech is normal   Level of consciousness: alert    CRANIAL NERVES     CN II   Visual fields full to confrontation.     CN III, IV, VI   Extraocular motions are normal.     CN V   Facial sensation intact.     CN VII   Facial expression full, symmetric.     CN VIII   Hearing: impaired    CN IX, X   CN IX normal.   CN X normal.     CN XI   CN XI normal.     CN XII   CN XII normal.     MOTOR EXAM   Muscle bulk: normal  Overall muscle tone: normal    Strength   Strength 5/5 throughout.     REFLEXES     Reflexes   Right brachioradialis: 2+  Left brachioradialis: 2+  Right biceps: 2+  Left biceps: 2+  Right patellar: 2+  Left patellar: 2+  Right achilles: 0  Left achilles: 0    SENSORY EXAM        Decreased sensation to light touch and vibration up to knees bilaterally. Absent vibratory sensation on L knee; partial vibratory sensation on R knee. Sensation normal in hands.     GAIT AND COORDINATION      Coordination   Finger to nose coordination: normal       Mildly unsteady; ambulates with walker     MRI brain from 2024 was personally visualized and compared to prior. Per my read, there is resolution of the occipital enhancing lesion. I do not appreciate the punctate enhancement in the left frontal lobe noted in the radiologist's report.     Assessment:       Problem List Items Addressed This Visit    None  Visit Diagnoses       Metastatic  melanoma    -  Primary    Relevant Orders    MRI Brain (Tumor with Perfusion) W W/O Contrast (XPD)    Metastasis to brain        Colitis                Plan:       Carl Hu is a 83 y.o. 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 Metastasis was asymptomatic and discovered on screening MRI brain. Treatment has resulted in resolution of enhancement in left occipital lesion with residual T2 FLAIR hyperintensity.    Melanoma metastatic to the brain  Asymptomatic from the metastasis. Imaging shows improvement in lesion s/p SRS and pembrolizumab. Re-image in 1 month. F/u after repeat imaging.    Colitis  Likely related to immunotherapy (pembrolizumab). Will defer to medical oncology re: treatment and necessity for cessation of immunotherapy.        Visit today included increased complexity associated with the care of the episodic problem colitis addressed and managing the longitudinal care of the patient due to the serious and/or complex managed problem(s) metastatic melanoma.     60 minutes of total time spent on the encounter, which includes face to face time and non-face to face time preparing to see the patient (eg, review of tests), Obtaining and/or reviewing separately obtained history, Documenting clinical information in the electronic or other health record, Independently interpreting results (not separately reported) and communicating results to the patient/family/caregiver, or Care coordination (not separately reported).     Dawn Nava MD  Department of Neurology  Neuro-Oncology, Movement Disorders

## 2024-04-05 ENCOUNTER — TELEPHONE (OUTPATIENT)
Dept: DERMATOLOGY | Facility: CLINIC | Age: 84
End: 2024-04-05
Payer: MEDICARE

## 2024-04-05 PROBLEM — C79.31 MALIGNANT MELANOMA METASTATIC TO BRAIN: Status: ACTIVE | Noted: 2024-04-05

## 2024-04-05 NOTE — TELEPHONE ENCOUNTER
Attempted to get in contact with pt. No answer. LVM to giver office a call back. 4/5 KF    ----- Message from Medina Amaral MD sent at 4/4/2024  4:33 PM CDT -----  Sure    Ghazala can you get him in next MM clinic?   thanks  ----- Message -----  From: Ghulam Altman MD  Sent: 4/4/2024   4:00 PM CDT  To: Medina Amaral MD; Cristin Haney,    This is a patient with metastatic melanoma and history of non melanoma skin cancers. He is not happy with his local dermatologist. Could you get him in your clinic for skin checks?    Thanks  Garrick

## 2024-04-05 NOTE — PROGRESS NOTES
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 [place] lymph node biopsy was negative.  Pathology:  PD-L1 positive (CPS = 20, TPS < 10/0),  TMB-HIGH (70.1), LUIS/MSI-LOW, NRAS p.G125 mutation positive, SETD2 p.  mutation positive, TSC1 p.Q109 mutation positive, ARID2 p. mutation  positive, ARID2 p.12.1754 mutation positive, NF1 p.[id number] mutation  positive, TERT p.? mutation positive, BRAF/CDKN2A/KIT/NTRK wild-type, HRD negative  4/4/2023: extensive lateral margin re-excision with negative margins  4/13/2023: staging PET with no evidence of disease  4/20/2023: staging MRI of the brain showed a 9x12mm left occipital lobe lesion consistent with metastasis  5/18/2023: SRS to 24Gy (1fx)  6/14/2023-present: adjuvant pembrolizumab IV 200mg q3 weeks (plan for 1 year)  6/14-7/28/2023: radiation therapy to left posterior scalp to 60Gy over 30fx  8/1/2023: surveillance MRI of the brain showed partial response to treatment  11/2023: re-staging PET showed no evidence of disease  2/2023: re-staging MRI of the brain showed no evidence of disease (radiology report mentions multiple punctate enhancing lesions in the left frontal lobe; I do not appreciate these on my imaging review)     4/5/2024 Cancer Staged    Staging form: Melanoma of the Skin, AJCC 8th Edition  - Clinical: Stage IV (cTX, cNX, cM1d)          HPI:   83 y.o. male who presents for evaluation and treatment recommendations. I have reviewed the patient's chart dating back the  "past year, and this is detailed in oncologic history as above.  Patient currently has had diarrhea x 3 weeks. Initially grade 1, but now says he has had "innumerable times per day". He is now having weakness and nausea as well. Denies HAs, vision changes, rashes, SOB, cough, cold or heat intolerance. He denies history of autoimmune disease.    ROS:   Review of Systems   Constitutional:  Positive for activity change, fatigue and unexpected weight change. Negative for chills and fever.   HENT:  Negative for mouth sores, nosebleeds and sore throat.    Respiratory:  Negative for cough, shortness of breath and wheezing.    Cardiovascular:  Negative for chest pain, palpitations and leg swelling.   Gastrointestinal:  Positive for abdominal pain, diarrhea and nausea. Negative for abdominal distention and blood in stool.   Endocrine: Negative for cold intolerance and heat intolerance.   Genitourinary:  Negative for dysuria, flank pain and frequency.   Musculoskeletal:  Negative for arthralgias, joint swelling and myalgias.   Skin:  Negative for color change, rash and wound.   Neurological:  Positive for weakness. Negative for dizziness, light-headedness and headaches.   Hematological:  Negative for adenopathy. Does not bruise/bleed easily.        Past Medical History:   Past Medical History:   Diagnosis Date    Arthritis     Asthma     BPH (benign prostatic hyperplasia) 04/08/2013    BPH (benign prostatic hyperplasia)     CAD (coronary artery disease) 04/08/2013    Cancer     Carotid stenosis 6/27/2023    Cataracts, bilateral 04/08/2013    COY (dyspnea on exertion) 04/08/2013    Encounter for blood transfusion     Erectile dysfunction     GERD (gastroesophageal reflux disease)     Hyperlipidemia 04/08/2013    Hypertension 04/08/2013    Ischemic cardiomyopathy     Kidney stone     Memory loss     MI (myocardial infarction) 01/2018    CECILE (obstructive sleep apnea) 6/27/2023    Other secondary pulmonary " hypertension     Pericarditis     Shingles     Statin intolerance         Past Surgical History:   Past Surgical History:   Procedure Laterality Date    CARDIAC CATHETERIZATION      CATARACT EXTRACTION W/ INTRAOCULAR LENS  IMPLANT, BILATERAL      COLONOSCOPY W/ POLYPECTOMY      CORONARY ANGIOPLASTY      CORONARY ANGIOPLASTY WITH STENT PLACEMENT      x2    COSMETIC SURGERY      EYE SURGERY      INSERTION OF BIVENTRICULAR IMPLANTABLE CARDIOVERTER-DEFIBRILLATOR (ICD) N/A 2018    Procedure: Implant ICD - bi ventricular;  Surgeon: Robert Weber MD;  Location: [facility] CATH;  Service: Cardiovascular;  Laterality: N/A;    KNEE ARTHROPLASTY Right     skin cancer removal      TOE SURGERY Right     nerve removal    TONSILLECTOMY          Family History:   Family History   Problem Relation Age of Onset    Hyperlipidemia Mother     Stroke Mother     Hyperlipidemia Father     Heart attack Father     Stroke Maternal Grandfather     Anemia Neg Hx     Arrhythmia Neg Hx     Asthma Neg Hx     Clotting disorder Neg Hx     Fainting Neg Hx     Heart disease Neg Hx     Heart failure Neg Hx     Hypertension Neg Hx         Social History:   Social History     Tobacco Use    Smoking status: Former     Current packs/day: 0.00     Types: Cigarettes     Quit date: 1979     Years since quittin.0    Smokeless tobacco: Never   Substance Use Topics    Alcohol use: No        Allergies:   Review of patient's allergies indicates:   Allergen Reactions    Statins-hmg-coa reductase inhibitors Other (See Comments)     Myalgias    Chocolate flavor     Sugar-protein-starch      sugars    Welchol [colesevelam]         Medications:   Current Outpatient Medications   Medication Sig Dispense Refill    amiodarone (PACERONE) 200 MG Tab Take 200 mg by mouth.      aspirin (ECOTRIN) 81 MG EC tablet Take 81 mg by mouth once daily.      CALCIUM CARBONATE/VITAMIN D3 (VITAMIN D-3 ORAL) Take 2,000 mg by mouth once  daily.      clopidogrel (PLAVIX) 75 mg tablet Take 75 mg by mouth once daily.      coenzyme Q10 100 mg capsule Take 100 mg by mouth once daily.      diphenoxylate-atropine 2.5-0.025 mg (LOMOTIL) 2.5-0.025 mg per tablet Take 1 tablet by mouth 4 (four) times daily as needed for Diarrhea.      evolocumab (REPATHA SYRINGE) 140 mg/mL Syrg Inject into the skin.      fish oil/borage/flax/om3,6,9 1 (OMEGA 3-6-9 ORAL) Take 2 tablets by mouth once daily. OMEGA XL      furosemide (LASIX) 20 MG tablet Take 20 mg by mouth 2 (two) times daily.      metoprolol succinate (TOPROL-XL) 50 MG 24 hr tablet Take 50 mg by mouth 2 (two) times daily.      nitroGLYCERIN (NITROSTAT) 0.4 MG SL tablet Place 0.4 mg under the tongue every 5 (five) minutes as needed.      ondansetron (ZOFRAN) 4 MG tablet Take 4 mg by mouth every 8 (eight) hours as needed for Nausea.      pantoprazole (PROTONIX) 40 MG tablet Take 40 mg by mouth once daily.      pembrolizumab (KEYTRUDA) 25 mg/mL Soln Inject into the vein.      spironolactone (ALDACTONE) 25 MG tablet Take 12.5 mg by mouth once daily.      traZODone (DESYREL) 50 MG tablet Take 1 tablet (50 mg total) by mouth every evening. 30 tablet 5    turmeric (CURCUMIN MISC) by Misc.(Non-Drug; Combo Route) route.      a-cysteine/arg zinc/glut/mv-mn (L GLUTAMINE-N ACET-ARG-VIT-MIN ORAL) Take by mouth once daily.      gabapentin (NEURONTIN) 100 MG capsule Take 100 mg by mouth 2 (two) times daily.      losartan (COZAAR) 25 MG tablet Take 25 mg by mouth once daily.      multivitamin capsule Take 1 capsule by mouth once daily.      mupirocin (BACTROBAN) 2 % ointment Apply topically 3 (three) times daily as needed.      predniSONE (DELTASONE) 20 MG tablet Take 4 tablets (80 mg total) by mouth once daily for 5 days, THEN 3.5 tablets (70 mg total) once daily for 4 days, THEN 3 tablets (60 mg total) once daily for 4 days, THEN 2.5 tablets (50 mg total) once daily for 4 days, THEN 2 tablets (40 mg total)  "once daily for 4 days, THEN 1.5 tablets (30 mg total) once daily for 4 days, THEN 1 tablet (20 mg total) once daily for 4 days, THEN 0.5 tablets (10 mg total) once daily for 4 days. 76 tablet 0    REPATHA SURECLICK 140 mg/mL PnIj Inject into the skin every 30 days.      traMADoL (ULTRAM) 50 mg tablet Take 50 mg by mouth every 6 (six) hours as needed.      triamcinolone acetonide 0.1% (KENALOG) 0.1 % cream Apply topically as needed.       No current facility-administered medications for this visit.        Physical Exam:   /77 (BP Location: Left arm, Patient Position: Sitting, BP Method: Large (Automatic))   Pulse 92   Resp 16   Ht 5' 11" (1.803 m)   Wt 112.5 kg (248 lb 0.3 oz)   SpO2 96%   BMI 34.59 kg/m²      ECOG Performance Status: (foot note - ECOG PS provided by [employer]cology [employer]) 1 - Symptomatic but completely ambulatory    Physical Exam  Constitutional:       Appearance: Normal appearance. He is well-developed.   HENT:      Head: Normocephalic and atraumatic.   Eyes:      Conjunctiva/sclera: Conjunctivae normal.      Pupils: Pupils are equal, round, and reactive to light.   Pulmonary:      Effort: Pulmonary effort is normal. No respiratory distress.   Abdominal:      Palpations: Abdomen is soft.      Tenderness: There is no abdominal tenderness.   Musculoskeletal:         General: No swelling. Normal range of motion.      Cervical back: Normal range of motion and neck supple.   Skin:     General: Skin is warm and dry.   Neurological:      General: No focal deficit present.      Mental Status: He is alert and oriented to person, place, and time.   Psychiatric:         Mood and Affect: Mood normal.         Behavior: Behavior normal.         Labs:   Recent Results (from the past 48 hour(s))   POCT glucose    Collection Time: 04/04/24 11:13 AM   Result Value Ref Range    POCT Glucose 103 70 - 110 mg/dL   Hepatitis B Surface Antigen    Collection Time: 04/04/24  4:15 PM   Result " Value Ref Range    Hepatitis B Surface Ag Non-reactive Non-reactive   Hepatitis B Core Antibody, Total    Collection Time: 04/04/24  4:15 PM   Result Value Ref Range    Hep B Core Total Ab Non-reactive Non-reactive   Hepatitis C Antibody    Collection Time: 04/04/24  4:15 PM   Result Value Ref Range    Hepatitis C Ab Non-reactive Non-reactive   HIV 1/2 Ag/Ab (4th Gen)    Collection Time: 04/04/24  4:15 PM   Result Value Ref Range    HIV 1/2 Ag/Ab Non-reactive Non-reactive     Imaging: I have personally reviewed patient's PETCT imaging showing no evidence of disease.  NM PET CT FDG Whole Body  Narrative: EXAMINATION:  NM PET CT FDG WHOLE BODY    CLINICAL HISTORY:  Melanoma; Unspecified malignant neoplasm of skin, unspecified    TECHNIQUE:  14.12 mCi of F18-FDG was administered intravenously in the right antecubital fossa.  After an approximately 60 min distribution time, PET/CT images were acquired from the vertex to feet. Transmission images were acquired to correct for attenuation using a whole body low-dose CT scan without IV contrast with the arms positioned along the side of the torso. Glycemia at the time of injection was 103 mg/dL.    COMPARISON:  Outside PET-CT dated 11/14/2023, outside MRI brain dated 02/16/2024    FINDINGS:  Quality of the study: Adequate.    In the head and neck, there is mild increased radiotracer uptake in the posterior left occipital scalp correlating with prior melanoma surgical resection. There are no hypermetabolic mucosal lesions, and there are no pathologically enlarged or hypermetabolic lymph nodes.    In the chest, there are no hypermetabolic lesions worrisome for malignancy.  There are no concerning pulmonary nodules or masses, and there are no pathologically enlarged or hypermetabolic lymph nodes.    In the abdomen and pelvis, there is physiologic tracer distribution within the abdominal organs and excretion into the genitourinary system.    There is a prominent tracer avid  focus within the colon near the level of the splenic flexure, no corresponding CT abnormality, SUV max 4.5 (axial fused image 166).    In the bones, there are no hypermetabolic lesions worrisome for malignancy.    There is diffuse symmetric hypermetabolic activity along multiple muscle groups bilaterally including within the hands, forearms, arms, scapulae, gluteal muscles, likely representing physiologic uptake.    In the extremities, there are no hypermetabolic lesions worrisome for malignancy.    Additional findings: Right IJ Port-A-Cath with vascular tip terminating in the distal SVC.  Dual lead cardiac pacing device.  Calcific atherosclerosis of the coronary arteries and thoracic aorta.  Aortoiliac calcific atherosclerosis.  Left renal cysts.  Colonic diverticulosis.  Prostatomegaly.  Fat containing left inguinal hernia.  Impression: Postoperative change of left posterior occipital scalp melanoma resection.  No focal abnormal radiotracer uptake to suggest local recurrence or distant metastatic disease.    Nonspecific tracer avid focus in the colon, near splenic flexure.  Could represent prominent physiologic uptake or underlying polyp.  Further evaluation versus attention on follow-up as clinically warranted.    Electronically signed by resident: Diana Price  Date:    04/04/2024  Time:    13:12    Electronically signed by: Khai Shoemaker  Date:    04/04/2024  Time:    15:22            Diagnoses:       1. 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 Skin cancer    3. Diarrhea, unspecified type    4. Hypertension, unspecified type    5. Hyperlipidemia, unspecified hyperlipidemia type    6. Benign prostatic hyperplasia, unspecified whether lower urinary tract symptoms present    7. Coronary artery disease, unspecified vessel or lesion type, unspecified whether angina present, unspecified whether native or transplanted heart          Assessment and Plan:         1,2. Stage IV M1d melanoma:   Patient is being  treated with [facility] and has come for a second opinion. He had resection of primary melanoma of the scalp and was found to have a single brain metastasis now s/p SRS. NGS showed high TMB and positive PD-L1, NRAS mutated, BRAF WT. He was started on adjuvant pembrolizumab. He has done well up until without side effects until he developed diarrhea 3 weeks ago.  - recommend holding Keytruda with severe colitis. Ok to move to surveillance without re-challenge as he has almost completed a year. If patient has progression, recommend reaching back out for recommendations of immunotherapy re-challenge with colitis biologic. Plan discussed with Dr. Hoskins.  - patient is requesting new dermatologist, referred to [facility].    3 Grade 3. Ordered stool studies and workup for potential biologic. Sending steroid taper. We should start either entyvio or infliximab if diarrhea not resolved in 2-3 days. Plan discussed with Dr. Hoskins    4-7 Continue to follow up with PCP.        60 minutes total time spent with patient, 45 minutes were spent face to face with the patient and his family to discuss the disease, natural history, treatment options and survival statistics. Greater than 50% of this time was for counseling.  15 minutes of chart review and coordination of care. I have provided the patient with an opportunity to ask questions and have all questions answered to his satisfaction.     he will return to clinic as needed, but knows to call in the interim if symptoms change or should a problem arise.    Ghulam Altman MD  Medical Oncology  Director [facility]  [facility]      Med Onc Chart Routing      Follow up with physician No follow up needed.   Follow up with [facility]    Infusion scheduling note    Injection scheduling note    Labs    Imaging    Pharmacy appointment    Other referrals

## 2024-04-06 ENCOUNTER — PATIENT MESSAGE (OUTPATIENT)
Dept: NEUROLOGY | Facility: CLINIC | Age: 84
End: 2024-04-06
Payer: MEDICARE

## 2024-04-06 ENCOUNTER — PATIENT MESSAGE (OUTPATIENT)
Dept: HEMATOLOGY/ONCOLOGY | Facility: CLINIC | Age: 84
End: 2024-04-06
Payer: MEDICARE

## 2024-04-08 ENCOUNTER — TELEPHONE (OUTPATIENT)
Dept: HEMATOLOGY/ONCOLOGY | Facility: CLINIC | Age: 84
End: 2024-04-08
Payer: MEDICARE

## 2024-04-08 ENCOUNTER — TELEPHONE (OUTPATIENT)
Dept: NEUROSURGERY | Facility: CLINIC | Age: 84
End: 2024-04-08
Payer: MEDICARE

## 2024-04-08 DIAGNOSIS — Z95.0 PACEMAKER: Primary | ICD-10-CM

## 2024-04-08 NOTE — TELEPHONE ENCOUNTER
S/W Pt regarding ICD.    Reports implant was placed in 2018 by Dr. Weber with [facility]. Reports he was not given an implant card with device info and to reach out to Dr. Weber office.     Called Dr. Weber office, implant info faxed and uploaded to chart. Will reach out to MRI and arrhythmia clinic to coordinate.

## 2024-04-08 NOTE — TELEPHONE ENCOUNTER
Attempted to return call to patient regarding oncology dietician referral but no response, LVM.  ----- Message from Neil Dhaliwal sent at 4/8/2024  3:20 PM CDT -----  Patient returning your call. Patient wants to schedule as virtual visit so he does not have to make the trip.

## 2024-04-08 NOTE — TELEPHONE ENCOUNTER
Called Pt to discuss ICD to obtain MRI brain.     If MRI compatible, will schedule for cardiac device check and CXR.     LVM for call back.

## 2024-04-09 ENCOUNTER — HOSPITAL ENCOUNTER (OUTPATIENT)
Dept: RADIOLOGY | Facility: HOSPITAL | Age: 84
Discharge: HOME | End: 2024-04-09
Attending: PSYCHIATRY & NEUROLOGY
Payer: MEDICARE

## 2024-04-09 DIAGNOSIS — Z95.0 PACEMAKER: ICD-10-CM

## 2024-04-09 PROCEDURE — 71046 X-RAY EXAM CHEST 2 VIEWS: CPT | Mod: 26,,, | Performed by: RADIOLOGY

## 2024-04-09 PROCEDURE — 71046 X-RAY EXAM CHEST 2 VIEWS: CPT | Mod: TC

## 2024-04-10 ENCOUNTER — TELEPHONE (OUTPATIENT)
Dept: HEMATOLOGY/ONCOLOGY | Facility: CLINIC | Age: 84
End: 2024-04-10
Payer: MEDICARE

## 2024-04-16 ENCOUNTER — TELEPHONE (OUTPATIENT)
Dept: DERMATOLOGY | Facility: CLINIC | Age: 84
End: 2024-04-16
Payer: MEDICARE

## 2024-04-16 NOTE — TELEPHONE ENCOUNTER
----- Message from Krystal Gar sent at 4/16/2024 10:22 AM CDT -----  Contact: [phone]  Type: Returning a call    Who left a message?Ghazala Aguayo LPN    When did the practice call? Today     Comments:

## 2024-04-16 NOTE — TELEPHONE ENCOUNTER
Attempted to get in contact with pt. No answer. LVM to give office a call back. 4/16 KF    ----- Message from Medina Amaral MD sent at 4/4/2024  4:33 PM CDT -----  Sure    Ghazala can you get him in next MM clinic?   thanks  ----- Message -----  From: Ghulam Altman MD  Sent: 4/4/2024   4:00 PM CDT  To: [facility] Amaral MD; Cristin Haney,    This is a patient with metastatic melanoma and history of non melanoma skin cancers. He is not happy with his local dermatologist. Could you get him in your clinic for skin checks?    Thanks  Garrick

## 2024-04-22 ENCOUNTER — TELEPHONE (OUTPATIENT)
Dept: DERMATOLOGY | Facility: CLINIC | Age: 84
End: 2024-04-22
Payer: MEDICARE

## 2024-04-22 NOTE — TELEPHONE ENCOUNTER
----- Message from Theresa Schneider MA sent at 4/16/2024  3:12 PM CDT -----  Regarding: FW: Appt  Contact: pt @  [phone]    ----- Message -----  From: Katherin Hastings  Sent: 4/16/2024  10:59 AM CDT  To: [facility] Staff  Subject: Appt                                             Message is for oksana Vivar missed your call. Pt has skin cancer calling to get appt.

## 2024-04-23 ENCOUNTER — TELEPHONE (OUTPATIENT)
Dept: DERMATOLOGY | Facility: CLINIC | Age: 84
End: 2024-04-23
Payer: MEDICARE

## 2024-04-23 NOTE — TELEPHONE ENCOUNTER
"Left message ----- Message from Madhavi Nichols RN sent at 4/22/2024  5:04 PM CDT -----    ----- Message -----  From: Bee Solorio  Sent: 4/22/2024   2:05 PM CDT  To: [facility] Staff    Consult/Advisory:      Name Of Caller: Self    Contact Preference:   [phone]     What is the nature of the call?: Returning call to office, Pt  stated if apt can be if possible 12noon       NEW PATIENT - DERM ([facility]) [[id number]]    Additional Notes:  "Thank you for all that you do for our patients"  "

## 2024-04-29 NOTE — PROGRESS NOTES
The patient location is: [place]  The chief complaint leading to consultation is: fatigue, weight change    Visit type: audiovisual    Face to Face time with patient: 53 minutes   65 minutes of total time spent on the encounter, which includes face to face time and non-face to face time preparing to see the patient (eg, review of tests), Obtaining and/or reviewing separately obtained history, Documenting clinical information in the electronic or other health record, Independently interpreting results (not separately reported) and communicating results to the patient/family/caregiver, or Care coordination (not separately reported).     Each patient to whom he or she provides medical services by telemedicine is:  (1) informed of the relationship between the physician and patient and the respective role of any other health care provider with respect to management of the patient; and (2) notified that he or she may decline to receive medical services by telemedicine and may withdraw from such care at any time.    Oncology Nutrition Assessment for Medical Nutrition Therapy  Initial Visit    Carl Hu   1940    Referring Provider: Ghulam Altman MD     Reason for Visit: Pt in for education and nutrition counseling     PMHx:   Past Medical History:   Diagnosis Date    Arthritis     Asthma     BPH (benign prostatic hyperplasia) 04/08/2013    BPH (benign prostatic hyperplasia)     CAD (coronary artery disease) 04/08/2013    Cancer     Carotid stenosis 6/27/2023    Cataracts, bilateral 04/08/2013    COY (dyspnea on exertion) 04/08/2013    Encounter for blood transfusion     Erectile dysfunction     GERD (gastroesophageal reflux disease)     Hyperlipidemia 04/08/2013    Hypertension 04/08/2013    Ischemic cardiomyopathy     Kidney stone     Memory loss     MI (myocardial infarction) 01/2018    CECILE (obstructive sleep apnea) 6/27/2023    Other secondary pulmonary hypertension     Pericarditis     Shingles      "Statin intolerance        Nutrition Assessment    Patient is a 83 y.o.male with oligometastatic melanoma. He is s/p surgery of the primary lesion and SRS for single brain metastasis. Most recently on Keytruda, but this was stopped due to severe colitis. Referred to nutrition due to unintentional weight loss.   He reports he has a lot of neuropathy and pain that is keeping him awake at night. Also reports fatigue. He does find that fish oil helps with pain and B vitamins (6,12) have helped with energy.    He has been taking prednisone as prescribed. Reports it's helping "everything." Feels great. His pain is better controlled and diarrhea has resolved. He is ok with recent weight loss, despite how fast this was. He would like to keep it off. Appetite is better.   He doesn't eat a lot of formal meals. Has dinner every night with his daughter. Reports some insomnia. Family and personal history of high cholesterol. He does try to watch his sugar intake and portion control. However reports eating more fast food than he would like.   Diet recall:   AM- 2 cups of coffee with creamer (real sugar)   Doesn't usually eat breakfast, just has coffee   Lunch- something light (yesterday one egg on toast)   7pm- dinner with daughter (BBQ chicken quarter, beans, small piece cake)  Snack- small cup sorbet, 2 energy bars (homemade from a bakery and trail mix bars)  Drinks about 3 bottles of water per day    Weight:108 kg (238 lb)  Height:5' 11" (1.803 m)  BMI:Body mass index is 33.19 kg/m².   IBW: Ideal body weight: 75.3 kg (166 lb 0.1 oz)  Adjusted ideal body weight: 88.4 kg (194 lb 12.9 oz)    Usual BW: 265lb  Weight Change: 27lb in one month (likely due to colitis/malabsorption)     Allergies: Statins-hmg-coa reductase inhibitors, Chocolate flavor, Sugar-protein-starch, and Welchol [colesevelam]    Current Medications:    Current Outpatient Medications:     amiodarone (PACERONE) 200 MG Tab, Take 200 mg by mouth., Disp: , Rfl:     " aspirin (ECOTRIN) 81 MG EC tablet, Take 81 mg by mouth once daily., Disp: , Rfl:     CALCIUM CARBONATE/VITAMIN D3 (VITAMIN D-3 ORAL), Take 2,000 mg by mouth once daily., Disp: , Rfl:     clopidogrel (PLAVIX) 75 mg tablet, Take 75 mg by mouth once daily., Disp: , Rfl:     coenzyme Q10 100 mg capsule, Take 100 mg by mouth once daily., Disp: , Rfl:     diphenoxylate-atropine 2.5-0.025 mg (LOMOTIL) 2.5-0.025 mg per tablet, Take 1 tablet by mouth 4 (four) times daily as needed for Diarrhea., Disp: , Rfl:     evolocumab (REPATHA SYRINGE) 140 mg/mL Syrg, Inject into the skin., Disp: , Rfl:     fish oil/borage/flax/om3,6,9 1 (OMEGA 3-6-9 ORAL), Take 2 tablets by mouth once daily. OMEGA XL, Disp: , Rfl:     furosemide (LASIX) 20 MG tablet, Take 20 mg by mouth 2 (two) times daily., Disp: , Rfl:     metoprolol succinate (TOPROL-XL) 50 MG 24 hr tablet, Take 50 mg by mouth 2 (two) times daily., Disp: , Rfl:     nitroGLYCERIN (NITROSTAT) 0.4 MG SL tablet, Place 0.4 mg under the tongue every 5 (five) minutes as needed., Disp: , Rfl:     ondansetron (ZOFRAN) 4 MG tablet, Take 4 mg by mouth every 8 (eight) hours as needed for Nausea., Disp: , Rfl:     pantoprazole (PROTONIX) 40 MG tablet, Take 40 mg by mouth once daily., Disp: , Rfl:     pembrolizumab (KEYTRUDA) 25 mg/mL Soln, Inject into the vein., Disp: , Rfl:     predniSONE (DELTASONE) 20 MG tablet, Take 4 tablets (80 mg total) by mouth once daily for 5 days, THEN 3.5 tablets (70 mg total) once daily for 4 days, THEN 3 tablets (60 mg total) once daily for 4 days, THEN 2.5 tablets (50 mg total) once daily for 4 days, THEN 2 tablets (40 mg total) once daily for 4 days, THEN 1.5 tablets (30 mg total) once daily for 4 days, THEN 1 tablet (20 mg total) once daily for 4 days, THEN 0.5 tablets (10 mg total) once daily for 4 days., Disp: 76 tablet, Rfl: 0    spironolactone (ALDACTONE) 25 MG tablet, Take 12.5 mg by mouth once daily., Disp: , Rfl:     traZODone (DESYREL) 50 MG tablet, Take  1 tablet (50 mg total) by mouth every evening., Disp: 30 tablet, Rfl: 5    turmeric (CURCUMIN MISC), by Misc.(Non-Drug; Combo Route) route., Disp: , Rfl:     Vitamins/Supplements: CoQ10 (per cardiology), omega 3 (omega XL) fish oil, Vitamin E (for skin), Vitamin B6, B12, magnesium, multivitamin, Vitamin D3   Used to take curcumin, but wasn't helping and caused stomach upset     Labs: Reviewed from the past 6 months     Nutrition Diagnosis    Problem: nutrition related knowledge deficit   Etiology (related to): lack of prior need for nutrition education  Signs/Symptoms (as evidenced by): self reported diet questions/concerns  and fatigue, high cholesterol, weight concerns     Nutrition Intervention    Nutrition Prescription   2150 Kcals (20kcal/kg)  86 g protein (0.8g/kg)   2700 mL fluid (25mL/kg)    Recommendations:  Add up to 600mg ALA daily for neuropathy   -start with 300mg and work up if needed   -divided doses ok  Aim for 25-30g added sugars per day   Limit fatty meats and high fat dairy   Increase soluble fiber intake   -oats, bananas, apples, nuts, seeds, peas, beans   More consistent meal with protein at each to help with fatigue   -aim to start earlier in the day and snack less at night  -consistent meal times   Lean proteins   -fish, shrimp, chicken, tuna, eggs (limit eggs to twice per week)  Aim for 5-6 bottles of water per day (try adding lemon)     Materials Provided/Reviewed   Sample meal/snack ideas sent via email     Nutrition Monitoring and Evaluation    Monitor: diet tolerance , weight, and diet education needs     Goals: improve diet quality     Follow up Patient will follow up via portal as needed with any questions/concerns     Communication to referring provider/care team: note available in chart     Counseling time: 45 Minutes    Nikki Upton, MPH, RD, , LDN, FAND  Board Certified Specialist in Oncology Nutrition   [phone]

## 2024-04-30 ENCOUNTER — CLINICAL SUPPORT (OUTPATIENT)
Dept: HEMATOLOGY/ONCOLOGY | Facility: CLINIC | Age: 84
End: 2024-04-30
Payer: MEDICARE

## 2024-04-30 ENCOUNTER — TELEPHONE (OUTPATIENT)
Dept: HEMATOLOGY/ONCOLOGY | Facility: CLINIC | Age: 84
End: 2024-04-30

## 2024-04-30 VITALS — BODY MASS INDEX: 33.32 KG/M2 | HEIGHT: 71 IN | WEIGHT: 238 LBS

## 2024-04-30 DIAGNOSIS — R19.7 DIARRHEA, UNSPECIFIED TYPE: ICD-10-CM

## 2024-04-30 DIAGNOSIS — Z71.3 NUTRITIONAL COUNSELING: Primary | ICD-10-CM

## 2024-04-30 DIAGNOSIS — R53.83 FATIGUE, UNSPECIFIED TYPE: ICD-10-CM

## 2024-04-30 DIAGNOSIS — C79.31 MALIGNANT MELANOMA METASTATIC TO BRAIN: ICD-10-CM

## 2024-04-30 DIAGNOSIS — E78.5 HYPERLIPIDEMIA, UNSPECIFIED HYPERLIPIDEMIA TYPE: Chronic | ICD-10-CM

## 2024-04-30 PROCEDURE — 97802 MEDICAL NUTRITION INDIV IN: CPT | Mod: 95,,, | Performed by: DIETITIAN, REGISTERED

## 2024-04-30 NOTE — TELEPHONE ENCOUNTER
Spoke to pt in regards to nutrition visit per appt request message. Pt wanted to make sure it was for 9 or 9:30AM. MA informed pt that its for 9:30AM with Nikki Upton RD. MA asked if pt has done the e-pre check questionnaire and pt stated pt has completed it. MA informed pt that a green button will populate 10 mins prior to appt time for pt to join for virtual visit. Pt acknowledged and express gratitude for calling pt.       COLE JACKSON ext [phone]

## 2024-05-01 ENCOUNTER — DOCUMENTATION ONLY (OUTPATIENT)
Dept: ELECTROPHYSIOLOGY | Facility: CLINIC | Age: 84
End: 2024-05-01
Payer: MEDICARE

## 2024-05-03 ENCOUNTER — TELEPHONE (OUTPATIENT)
Dept: DERMATOLOGY | Facility: CLINIC | Age: 84
End: 2024-05-03
Payer: MEDICARE

## 2024-05-03 ENCOUNTER — PATIENT MESSAGE (OUTPATIENT)
Dept: DERMATOLOGY | Facility: CLINIC | Age: 84
End: 2024-05-03
Payer: MEDICARE

## 2024-05-03 NOTE — TELEPHONE ENCOUNTER
----- Message from Medina Amaral MD sent at 5/2/2024  9:12 AM CDT -----  Try again for next avail MM clinic. thanks  ----- Message -----  From: Ghazala Aguayo LPN  Sent: 4/16/2024  10:18 AM CDT  To: Medina Amaral MD    Attempted to get in contact with pt. No answer. LVM to give office a call back. 4/16 KF  ----- Message -----  From: Medina Amaral MD  Sent: 4/11/2024   3:14 PM CDT  To: Cristin COHN Staff    Can we tr try him for MM clinic ?  ----- Message -----  From: Ghazala Aguayo LPN  Sent: 4/5/2024   8:44 AM CDT  To: Medina Amaral MD    Attempted to get in contact with pt. No answer. LVM to giver office a call back. 4/5 KF  ----- Message -----  From: Medina Amaral MD  Sent: 4/4/2024   4:33 PM CDT  To: Ghulam Altman MD; CIRO Garcia can you get him in next MM clinic?   thanks  ----- Message -----  From: Ghulam Altman MD  Sent: 4/4/2024   4:00 PM CDT  To: Medina Amaral MD; Cristin COHN Staff    Mandie Haney,    This is a patient with metastatic melanoma and history of non melanoma skin cancers. He is not happy with his local dermatologist. Could you get him in your clinic for skin checks?    Thanks  Garrick

## 2024-05-06 ENCOUNTER — PATIENT MESSAGE (OUTPATIENT)
Dept: NEUROLOGY | Facility: CLINIC | Age: 84
End: 2024-05-06
Payer: MEDICARE

## 2024-05-07 ENCOUNTER — TELEPHONE (OUTPATIENT)
Dept: DERMATOLOGY | Facility: CLINIC | Age: 84
End: 2024-05-07
Payer: MEDICARE

## 2024-05-07 NOTE — TELEPHONE ENCOUNTER
Attempted to get in contact with pt to sched MM clinic appt. No answer. TYRELL and msgd pt via the portal.

## 2024-05-08 ENCOUNTER — HOSPITAL ENCOUNTER (OUTPATIENT)
Dept: RADIOLOGY | Facility: HOSPITAL | Age: 84
Discharge: HOME | End: 2024-05-08
Attending: PSYCHIATRY & NEUROLOGY
Payer: MEDICARE

## 2024-05-08 ENCOUNTER — OFFICE VISIT (OUTPATIENT)
Dept: NEUROLOGY | Facility: CLINIC | Age: 84
End: 2024-05-08
Payer: MEDICARE

## 2024-05-08 VITALS — HEART RATE: 80 BPM

## 2024-05-08 VITALS — SYSTOLIC BLOOD PRESSURE: 127 MMHG | HEART RATE: 73 BPM | DIASTOLIC BLOOD PRESSURE: 79 MMHG

## 2024-05-08 DIAGNOSIS — C43.9 METASTATIC MELANOMA: Primary | ICD-10-CM

## 2024-05-08 DIAGNOSIS — C43.9 METASTATIC MELANOMA: ICD-10-CM

## 2024-05-08 PROCEDURE — 99215 OFFICE O/P EST HI 40 MIN: CPT | Mod: S$PBB,,, | Performed by: PSYCHIATRY & NEUROLOGY

## 2024-05-08 PROCEDURE — 70553 MRI BRAIN STEM W/O & W/DYE: CPT | Mod: 26,,, | Performed by: RADIOLOGY

## 2024-05-08 PROCEDURE — 99999 PR PBB SHADOW E&M-EST. PATIENT-LVL III: CPT | Mod: PBBFAC,,, | Performed by: PSYCHIATRY & NEUROLOGY

## 2024-05-08 PROCEDURE — A9585 GADOBUTROL INJECTION: HCPCS | Performed by: PSYCHIATRY & NEUROLOGY

## 2024-05-08 PROCEDURE — 25500020 PHARM REV CODE 255: Performed by: PSYCHIATRY & NEUROLOGY

## 2024-05-08 PROCEDURE — 70553 MRI BRAIN STEM W/O & W/DYE: CPT | Mod: TC

## 2024-05-08 PROCEDURE — 99213 OFFICE O/P EST LOW 20 MIN: CPT | Mod: PBBFAC,25 | Performed by: PSYCHIATRY & NEUROLOGY

## 2024-05-08 PROCEDURE — G2211 COMPLEX E/M VISIT ADD ON: HCPCS | Mod: S$PBB,,, | Performed by: PSYCHIATRY & NEUROLOGY

## 2024-05-08 RX ORDER — GABAPENTIN 100 MG/1
100 CAPSULE ORAL 2 TIMES DAILY
COMMUNITY
Start: 2024-04-20 | End: 2024-06-04

## 2024-05-08 RX ORDER — GADOBUTROL 604.72 MG/ML
10 INJECTION INTRAVENOUS
Status: COMPLETED | OUTPATIENT
Start: 2024-05-08 | End: 2024-05-08

## 2024-05-08 RX ADMIN — GADOBUTROL 10 ML: 604.72 INJECTION INTRAVENOUS at 11:05

## 2024-05-08 NOTE — PROGRESS NOTES
MRI completed pt moved back to Zone 3 where Ava with [facility] placed cardiac device back to normal mode / pt escorted to [facility] and D/C home with family members

## 2024-05-08 NOTE — PROGRESS NOTES
Subjective:       Patient ID: Carl Hu is a 83 y.o. male.    Chief Complaint: No chief complaint on file.    HPI  Oncologic History  3/2/2023: resection of posterior scalp lesion with pathology consistent with lentigo malignant melanoma, PD-L1 positive, BRAF-wildtype. [place] lymph node biopsy was negative.  Pathology:  PD-L1 positive (CPS = 20, TPS < 10/0),  TMB-HIGH (70.1), LUIS/MSI-LOW, NRAS p.G125 mutation positive, SETD2 p.  mutation positive, TSC1 p.Q109 mutation positive, ARID2 p. mutation  positive, ARID2 p.12.1754 mutation positive, NF1 p.[id number] mutation  positive, TERT p.? mutation positive, BRAF/CDKN2A/KIT/NTRK wild-type, HRD negative  4/4/2023: extensive lateral margin re-excision with negative margins  4/13/2023: staging PET with no evidence of disease  4/20/2023: staging MRI of the brain showed a 9x12mm left occipital lobe lesion consistent with metastasis  5/18/2023: SRS to 24Gy (1fx)  6/14/2023-4/2023: adjuvant pembrolizumab IV 200mg q3 weeks; discontinued due to drug-induced colitis  6/14-7/28/2023: radiation therapy to left posterior scalp to 60Gy over 30fx  8/1/2023: surveillance MRI of the brain showed partial response to treatment  11/2023: re-staging PET showed no evidence of disease  2/2023: re-staging MRI of the brain showed no evidence of disease (radiology report mentions multiple punctate enhancing lesions in the left frontal lobe; I do not appreciate these on my imaging review)    Medical oncologist: Dr. Chi Hoskins ([facility])  Radiation oncologist: Dr. Kyra Fagan    Mr. Hu presents today alone. Colitis is much improved. He has no new symptoms.    Past Medical History:   Diagnosis Date    Arthritis     Asthma     BPH (benign prostatic hyperplasia) 04/08/2013    BPH (benign prostatic hyperplasia)     CAD (coronary artery disease) 04/08/2013    Cancer     Carotid stenosis 6/27/2023    Cataracts, bilateral 04/08/2013    COY (dyspnea on exertion)  04/08/2013    Encounter for blood transfusion     Erectile dysfunction     GERD (gastroesophageal reflux disease)     Hyperlipidemia 04/08/2013    Hypertension 04/08/2013    Ischemic cardiomyopathy     Kidney stone     Memory loss     MI (myocardial infarction) 01/2018    CECILE (obstructive sleep apnea) 6/27/2023    Other secondary pulmonary hypertension     Pericarditis     Shingles     Statin intolerance       Current Outpatient Medications   Medication Sig Dispense Refill    amiodarone (PACERONE) 200 MG Tab Take 200 mg by mouth.      aspirin (ECOTRIN) 81 MG EC tablet Take 81 mg by mouth once daily.      CALCIUM CARBONATE/VITAMIN D3 (VITAMIN D-3 ORAL) Take 2,000 mg by mouth once daily.      clopidogrel (PLAVIX) 75 mg tablet Take 75 mg by mouth once daily.      coenzyme Q10 100 mg capsule Take 100 mg by mouth once daily.      diphenoxylate-atropine 2.5-0.025 mg (LOMOTIL) 2.5-0.025 mg per tablet Take 1 tablet by mouth 4 (four) times daily as needed for Diarrhea.      evolocumab (REPATHA SYRINGE) 140 mg/mL Syrg Inject into the skin.      fish oil/borage/flax/om3,6,9 1 (OMEGA 3-6-9 ORAL) Take 2 tablets by mouth once daily. OMEGA XL      furosemide (LASIX) 20 MG tablet Take 20 mg by mouth 2 (two) times daily.      gabapentin (NEURONTIN) 100 MG capsule Take 100 mg by mouth 2 (two) times daily.      metoprolol succinate (TOPROL-XL) 50 MG 24 hr tablet Take 50 mg by mouth 2 (two) times daily.      nitroGLYCERIN (NITROSTAT) 0.4 MG SL tablet Place 0.4 mg under the tongue every 5 (five) minutes as needed.      ondansetron (ZOFRAN) 4 MG tablet Take 4 mg by mouth every 8 (eight) hours as needed for Nausea.      pantoprazole (PROTONIX) 40 MG tablet Take 40 mg by mouth once daily.      spironolactone (ALDACTONE) 25 MG tablet Take 12.5 mg by mouth once daily.      traZODone (DESYREL) 50 MG tablet Take 1 tablet (50 mg total) by mouth every evening. 30 tablet 5    pembrolizumab (KEYTRUDA) 25 mg/mL Soln Inject into the vein.       turmeric (CURCUMIN MISC) by Misc.(Non-Drug; Combo Route) route.       No current facility-administered medications for this visit.      Past Surgical History:   Procedure Laterality Date    CARDIAC CATHETERIZATION      CATARACT EXTRACTION W/ INTRAOCULAR LENS  IMPLANT, BILATERAL      COLONOSCOPY W/ POLYPECTOMY      CORONARY ANGIOPLASTY      CORONARY ANGIOPLASTY WITH STENT PLACEMENT      x2    COSMETIC SURGERY      EYE SURGERY      INSERTION OF BIVENTRICULAR IMPLANTABLE CARDIOVERTER-DEFIBRILLATOR (ICD) N/A 2018    Procedure: Implant ICD - bi ventricular;  Surgeon: Robert Weber MD;  [facility]: [facility] C[facility];  Service: Cardiovascular;  Laterality: N/A;    KNEE ARTHROPLASTY Right     skin cancer removal      TOE SURGERY Right     nerve removal    TONSILLECTOMY        Family History   Problem Relation Name Age of Onset    Hyperlipidemia Mother      Stroke Mother      Hyperlipidemia Father      Heart attack Father      Stroke Maternal Grandfather      Anemia Neg Hx      Arrhythmia Neg Hx      Asthma Neg Hx      Clotting disorder Neg Hx      Fainting Neg Hx      Heart disease Neg Hx      Heart failure Neg Hx      Hypertension Neg Hx        Social History     Tobacco Use    Smoking status: Former     Current packs/day: 0.00     Types: Cigarettes     Quit date: 1979     Years since quittin.1    Smokeless tobacco: Never   Substance Use Topics    Alcohol use: No    Drug use: No      Review of Systems  KPS: 90      Objective:      Vitals:    24 1403   BP: 127/79   Pulse: 73        NEUROLOGICAL EXAMINATION:     MENTAL STATUS   Attention: normal. Concentration: normal.   Speech: speech is normal   Level of consciousness: alert    CRANIAL NERVES     CN II   Visual fields full to confrontation.     CN III, IV, VI   Extraocular motions are normal.     CN V   Facial sensation intact.     CN VII   Facial expression full, symmetric.     CN VIII   Hearing: impaired    CN IX, X   CN IX normal.   CN X normal.     CN XI    CN XI normal.     CN XII   CN XII normal.     MOTOR EXAM   Muscle bulk: normal  Overall muscle tone: normal    Strength   Strength 5/5 throughout.     REFLEXES     Reflexes   Right brachioradialis: 2+  Left brachioradialis: 2+  Right biceps: 2+  Left biceps: 2+  Right patellar: 2+  Left patellar: 2+  Right achilles: 0  Left achilles: 0    SENSORY EXAM        Decreased sensation to light touch and vibration up to knees bilaterally. Absent vibratory sensation on L knee; partial vibratory sensation on R knee. Sensation normal in hands.     GAIT AND COORDINATION      Coordination   Finger to nose coordination: normal       Mildly unsteady; ambulates with walker     MRI brain from 5/8/2024 was personally visualized and compared to prior. Per my read, this is a stable study without evidence of new or progressive metastatic disease.     Assessment:       Problem List Items Addressed This Visit    None  Visit Diagnoses       Metastatic melanoma    -  Primary    Relevant Orders    MRI Brain (Tumor with Perfusion) W W/O Contrast (XPD)    Cardiac device check - In Clinic & Hospital              Plan:       Carl Hu is a 83 y.o. 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 Metastasis was asymptomatic and discovered on screening MRI brain. Treatment has resulted in resolution of enhancement in left occipital lesion with residual T2 FLAIR hyperintensity.    Melanoma metastatic to the brain  Asymptomatic from the metastasis. Imaging shows stability of known metastatic lesion. Re-image in 3 months. F/u after repeat imaging. Continue to follow with local medical oncologist.        Visit today included increased complexity associated with the care of the episodic problem addressed and managing the longitudinal care of the patient due to the serious and/or complex managed problem(s) metastatic melanoma.     40 minutes of total time spent on  the encounter, which includes face to face time and non-face to face time preparing to see the patient (eg, review of tests), Obtaining and/or reviewing separately obtained history, Documenting clinical information in the electronic or other health record, Independently interpreting results (not separately reported) and communicating results to the patient/family/caregiver, or Care coordination (not separately reported).     Dawn Nava MD  Department of Neurology  Neuro-Oncology, Movement Disorders

## 2024-05-08 NOTE — PROGRESS NOTES
Pt ambulatory with walker to MRI Zone 3 where Ava with [facility] placed cardiac device to ODO80 and MRI Mode / pt to Zone 4 placed to table with assistance and to monitor / ready foe scan

## 2024-06-04 ENCOUNTER — LAB VISIT (OUTPATIENT)
Dept: LAB | Facility: HOSPITAL | Age: 84
End: 2024-06-04
Attending: NURSE PRACTITIONER
Payer: MEDICARE

## 2024-06-04 ENCOUNTER — OFFICE VISIT (OUTPATIENT)
Dept: NEUROLOGY | Facility: CLINIC | Age: 84
End: 2024-06-04
Payer: MEDICARE

## 2024-06-04 VITALS
DIASTOLIC BLOOD PRESSURE: 70 MMHG | HEART RATE: 87 BPM | HEIGHT: 71 IN | BODY MASS INDEX: 33.1 KG/M2 | RESPIRATION RATE: 20 BRPM | SYSTOLIC BLOOD PRESSURE: 100 MMHG | OXYGEN SATURATION: 100 % | WEIGHT: 236.44 LBS

## 2024-06-04 DIAGNOSIS — C79.31 MALIGNANT MELANOMA METASTATIC TO BRAIN: Primary | ICD-10-CM

## 2024-06-04 DIAGNOSIS — R41.3 MEMORY LOSS: ICD-10-CM

## 2024-06-04 DIAGNOSIS — R53.81 OTHER MALAISE: ICD-10-CM

## 2024-06-04 DIAGNOSIS — M79.671 FOOT PAIN, BILATERAL: ICD-10-CM

## 2024-06-04 DIAGNOSIS — M79.672 FOOT PAIN, BILATERAL: ICD-10-CM

## 2024-06-04 DIAGNOSIS — Z92.3 HX OF RADIATION THERAPY: ICD-10-CM

## 2024-06-04 DIAGNOSIS — R79.9 ABNORMAL FINDING OF BLOOD CHEMISTRY, UNSPECIFIED: ICD-10-CM

## 2024-06-04 DIAGNOSIS — G47.33 OSA (OBSTRUCTIVE SLEEP APNEA): ICD-10-CM

## 2024-06-04 LAB
ALBUMIN SERPL BCP-MCNC: 3.2 G/DL (ref 3.5–5.2)
ALP SERPL-CCNC: 110 U/L (ref 55–135)
ALT SERPL W/O P-5'-P-CCNC: 19 U/L (ref 10–44)
ANION GAP SERPL CALC-SCNC: 11 MMOL/L (ref 8–16)
AST SERPL-CCNC: 24 U/L (ref 10–40)
BASOPHILS # BLD AUTO: 0.05 K/UL (ref 0–0.2)
BASOPHILS NFR BLD: 0.6 % (ref 0–1.9)
BILIRUB SERPL-MCNC: 0.6 MG/DL (ref 0.1–1)
BUN SERPL-MCNC: 18 MG/DL (ref 8–23)
CALCIUM SERPL-MCNC: 9.4 MG/DL (ref 8.7–10.5)
CHLORIDE SERPL-SCNC: 102 MMOL/L (ref 95–110)
CO2 SERPL-SCNC: 26 MMOL/L (ref 23–29)
CREAT SERPL-MCNC: 1.4 MG/DL (ref 0.5–1.4)
DIFFERENTIAL METHOD BLD: ABNORMAL
EOSINOPHIL # BLD AUTO: 0.1 K/UL (ref 0–0.5)
EOSINOPHIL NFR BLD: 1.6 % (ref 0–8)
ERYTHROCYTE [DISTWIDTH] IN BLOOD BY AUTOMATED COUNT: 12.8 % (ref 11.5–14.5)
EST. GFR  (NO RACE VARIABLE): 49 ML/MIN/1.73 M^2
ESTIMATED AVG GLUCOSE: 114 MG/DL (ref 68–131)
FOLATE SERPL-MCNC: 5.5 NG/ML (ref 4–24)
GLUCOSE SERPL-MCNC: 94 MG/DL (ref 70–110)
HBA1C MFR BLD: 5.6 % (ref 4–5.6)
HCT VFR BLD AUTO: 39.9 % (ref 40–54)
HGB BLD-MCNC: 13 G/DL (ref 14–18)
IMM GRANULOCYTES # BLD AUTO: 0.13 K/UL (ref 0–0.04)
IMM GRANULOCYTES NFR BLD AUTO: 1.5 % (ref 0–0.5)
LYMPHOCYTES # BLD AUTO: 2.4 K/UL (ref 1–4.8)
LYMPHOCYTES NFR BLD: 27.8 % (ref 18–48)
MCH RBC QN AUTO: 31.6 PG (ref 27–31)
MCHC RBC AUTO-ENTMCNC: 32.6 G/DL (ref 32–36)
MCV RBC AUTO: 97 FL (ref 82–98)
MONOCYTES # BLD AUTO: 1.1 K/UL (ref 0.3–1)
MONOCYTES NFR BLD: 13.4 % (ref 4–15)
NEUTROPHILS # BLD AUTO: 4.7 K/UL (ref 1.8–7.7)
NEUTROPHILS NFR BLD: 55.1 % (ref 38–73)
NRBC BLD-RTO: 0 /100 WBC
PLATELET # BLD AUTO: 226 K/UL (ref 150–450)
PMV BLD AUTO: 8.2 FL (ref 9.2–12.9)
POTASSIUM SERPL-SCNC: 3.7 MMOL/L (ref 3.5–5.1)
PROT SERPL-MCNC: 6.7 G/DL (ref 6–8.4)
RBC # BLD AUTO: 4.11 M/UL (ref 4.6–6.2)
SODIUM SERPL-SCNC: 139 MMOL/L (ref 136–145)
T4 SERPL-MCNC: 10.4 UG/DL (ref 4.5–11.5)
TREPONEMA PALLIDUM IGG+IGM AB [PRESENCE] IN SERUM OR PLASMA BY IMMUNOASSAY: NONREACTIVE
TSH SERPL DL<=0.005 MIU/L-ACNC: 2.05 UIU/ML (ref 0.4–4)
VIT B12 SERPL-MCNC: >2000 PG/ML (ref 210–950)
WBC # BLD AUTO: 8.5 K/UL (ref 3.9–12.7)

## 2024-06-04 PROCEDURE — 86334 IMMUNOFIX E-PHORESIS SERUM: CPT | Performed by: NURSE PRACTITIONER

## 2024-06-04 PROCEDURE — 86038 ANTINUCLEAR ANTIBODIES: CPT | Performed by: NURSE PRACTITIONER

## 2024-06-04 PROCEDURE — 84436 ASSAY OF TOTAL THYROXINE: CPT | Performed by: NURSE PRACTITIONER

## 2024-06-04 PROCEDURE — 82746 ASSAY OF FOLIC ACID SERUM: CPT | Performed by: NURSE PRACTITIONER

## 2024-06-04 PROCEDURE — 84165 PROTEIN E-PHORESIS SERUM: CPT | Performed by: NURSE PRACTITIONER

## 2024-06-04 PROCEDURE — 99213 OFFICE O/P EST LOW 20 MIN: CPT | Mod: PBBFAC | Performed by: NURSE PRACTITIONER

## 2024-06-04 PROCEDURE — 85025 COMPLETE CBC W/AUTO DIFF WBC: CPT | Performed by: NURSE PRACTITIONER

## 2024-06-04 PROCEDURE — 82607 VITAMIN B-12: CPT | Performed by: NURSE PRACTITIONER

## 2024-06-04 PROCEDURE — 84443 ASSAY THYROID STIM HORMONE: CPT | Performed by: NURSE PRACTITIONER

## 2024-06-04 PROCEDURE — 80053 COMPREHEN METABOLIC PANEL: CPT | Performed by: NURSE PRACTITIONER

## 2024-06-04 PROCEDURE — 84165 PROTEIN E-PHORESIS SERUM: CPT | Mod: 26,,, | Performed by: PATHOLOGY

## 2024-06-04 PROCEDURE — 83036 HEMOGLOBIN GLYCOSYLATED A1C: CPT | Performed by: NURSE PRACTITIONER

## 2024-06-04 PROCEDURE — 99215 OFFICE O/P EST HI 40 MIN: CPT | Mod: S$PBB | Performed by: NURSE PRACTITIONER

## 2024-06-04 PROCEDURE — 84425 ASSAY OF VITAMIN B-1: CPT | Performed by: NURSE PRACTITIONER

## 2024-06-04 PROCEDURE — 99999 PR STA SHADOW: CPT | Mod: PBBFAC,,, | Performed by: NURSE PRACTITIONER

## 2024-06-04 PROCEDURE — 86593 SYPHILIS TEST NON-TREP QUANT: CPT | Performed by: NURSE PRACTITIONER

## 2024-06-04 PROCEDURE — 36415 COLL VENOUS BLD VENIPUNCTURE: CPT | Performed by: NURSE PRACTITIONER

## 2024-06-04 PROCEDURE — 99999 PR PBB SHADOW E&M-EST. PATIENT-LVL III: CPT | Mod: PBBFAC,,, | Performed by: NURSE PRACTITIONER

## 2024-06-04 PROCEDURE — 86334 IMMUNOFIX E-PHORESIS SERUM: CPT | Mod: 26,,, | Performed by: PATHOLOGY

## 2024-06-04 RX ORDER — PREDNISONE 5 MG/1
5 TABLET ORAL DAILY
COMMUNITY

## 2024-06-04 NOTE — PROGRESS NOTES
"Subjective:      Chief Complaint:  No chief complaint on file.      History of Present Illness  Carl Hu is a 83 y.o. 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 He has had radiation therapy for both. He has HTN and HLD, CECILE, as well as a history of lumbar radicular pain (evaluated in clinic in 2016 for this). History of surgery to right knee and left knee injury.     He presents today for a follow up visit. Neuropsych testing was ordered at his last visit for further evaluation of his worsening memory loss. He was scheduled for testing on May 1st, 2024. He did complete testing; however, I do not have this report for review. He states that his metrics mostly described him as average and above average.     He describes his memory as "not very good". He was on the phone with his friend yesterday and couldn't recall what they were discussing.     He was referred to Neuro-Oncology at [facility] for an opinion on his metastatic melanoma. His most recent MRI from 2/2024 was reviewed, and Dr. Nava did not appreciate the punctate lesion to the left frontal lobe mentioned by the Radiologist who read the report. MRI Brain done last month failed to show any significant changes.     He was given steroids to treat for colitis per [facility], and had great improvement in his GI complaints. He began to feel worse after getting off of Prednisone. He states that there were multiple reasons that he felt worse off of Prednisone, including skin issues to his hands, pain from "peripheral neuropathy". He also felt that his mood was better on Prednisone.     He saw his PCP to request to restart low dose Prednisone, and his PCP restarted Prednisone at 5 mg every day. PCP recently treated him with antibiotics for blisters and swelling to his hands. He tried Voltaren for his hand pain and this was helpful. " "He has not seen Dermatology for the recent skin lesions/blisters to his hands.     He contacted clinic with report of pain from "peripheral neuropathy" and requested an Rx for compound cream. This was deferred to Oncology, as Oncology had been treating him for it, and I had never evaluated him for these complaints. His foot pain began years ago, but this became bothersome after he received radiation. He noted that this feet looked purple in the shower years ago, then he began to feel numbness. He began to experience pain to his feet after having radiation and also to his hands recently. He discussed his symptoms with another provider, who recommended that he take an over the counter supplement, then Gabapentin. Neither one were effective.     He is scheduled to meet with the [place]an[place] group [place] [facility] [place].     He repeated his sleep study recently per PCP. He does not yet have a CPAP. He saw Dr. Merino.     Trazodone was started at his last visit for insomnia. He stopped taking this nightly, as he felt that this worsened his memory, so he takes this only as needed, rarely. He is sleeping much better.     He stopped driving as a precaution. He is selling his vehicle.     I have reviewed all of this patient's past medical and surgical histories as well as family and social histories and active allergies and medications as documented in the electronic medical record.    Review of Systems  Review of Systems   Constitutional:  Negative for activity change, appetite change, fatigue, fever and unexpected weight change.   HENT:  Positive for hearing loss. Negative for congestion, drooling, ear pain, mouth sores, sinus pressure, tinnitus, trouble swallowing and voice change.    Eyes:  Negative for photophobia and visual disturbance.        Blurred vision at times   Respiratory:  Negative for apnea, choking and shortness of breath.    Cardiovascular:  Negative for chest pain, palpitations and leg swelling.   Gastrointestinal:  " Negative for constipation, diarrhea, nausea and vomiting.   Endocrine: Negative.    Genitourinary:  Negative for dysuria.   Musculoskeletal:  Positive for back pain and neck stiffness. Negative for arthralgias, gait problem, joint swelling, myalgias and neck pain.        He has had neck stiffness since having two skin cancer excisions   Skin:  Negative for rash and wound.   Neurological:  Negative for dizziness, tremors, seizures, syncope, facial asymmetry, speech difficulty, weakness, light-headedness, numbness and headaches.   Hematological:  Does not bruise/bleed easily.   Psychiatric/Behavioral:  Negative for agitation, behavioral problems, confusion, decreased concentration, dysphoric mood, hallucinations, sleep disturbance and suicidal ideas. The patient is not nervous/anxious.         Memory loss       Objective:       There were no vitals filed for this visit.    Exam:  Gen Appearance, well developed/nourished in no apparent distress  CV: 2+ distal pulses with no edema or swelling  Neuro:  MS: Awake, alert, oriented to place, person, time, situation. Sustains attention. Recent/remote memory intact, Language is full to spontaneous speech/repetition/naming/comprehension. Fund of Knowledge is full. Speech is tangential, which is his baseline. Interrupts examiner frequently throughout visit. Clock drawing is excellent today.   CN: PERRL, Extraoccular movements and visual fields are full. Normal facial sensation and strength, Hearing symmetric, Tongue and Palate are midline and strong. Shoulder Shrug symmetric and strong.  Motor: Normal bulk, tone, no abnormal movements. 5/5 strength bilateral upper/lower extremities with 2+ reflexes and bilateral plantar response  Sensory: symmetric to light touch, pain, temp, and vibration/proprioception. Romberg negative  Cerebellar: Finger-nose, Heal-shin, Rapid alternating movements intact  Gait: Normal stance, no ataxia, but gait is arthralgic    Imaging:  MRI Brain  5/8/24:  MRI brain: Relatively stable subcentimeter region of enhancement within the left occipital lobe with superimposed T2 flair signal hyperintensity suggestive for evolving post treatment change with solitary metastases     No evidence for significant new signal abnormality or new enhancing lesion to suggest metastatic disease progression.     Scattered T2 FLAIR signal abnormality elsewhere supratentorial white matter which is nonspecific and may represent mild chronic ischemic change     MR perfusion: No abnormal elevated relative cerebral blood volume associated with the left cerebellar lesion allowing for susceptibility artifact likely from post treatment change.     MRI Brain 2/2024:  There has been apparent development of some tiny enhancing foci scattered throughout the brain parenchyma consistent with developing metastasis. These are less than 3 mm, and are too small to be definitively characterized.     MRI Brain with and without contrast in 4/2023 showed a metastatic lesion within the cortex of the medial left occipital lobe, measuring 9 x 12 mm.      Review of records from [facility]:   Echo 5/2023 showed an EF of 35%, PAP 26 mmHg, pacemaker lead present. Other findings were mild.     5/2023 Echo per [facility]:   EF 35%    2016 MRI L-spine done at [facility] (in media):   Mild stenosis at multiple levels    Labs:  6/2023 CBC, CMP, TSh, T4, B12, Ferritin, folate, RPR overall unremarkable    Assessment:      1. 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 Memory loss    3. CECILE (obstructive sleep apnea)    4. Hx of radiation therapy        Plan:   I recommend:   MRI Brain with and without contrast in 4/2023 showed a metastatic lesion within the cortex of the medial left occipital lobe, measuring 9 x 12 mm.   -MRI Brain update in 2/2024 suggested metastasis; however, this was not appreciated by [place] [facility] seeing. MRI Brain update in 5/2024 failed to demonstrated any significant changes.      -His skin cancer is being managed by both Dr. Dolan, Dr. Merino, and [facility].  -currently receiving radiation for his skin cancer.     2. He has short term memory complaints, which began around the time of his skin cancer/brain tumor diagnosis. Possible contributions are situational anxiety/depression, untreated CECILE (no longer using CPAP), heart failure/reduced EF, recent radiation therapy, and brain tumor treated with radiation.   -labs to evaluate for systemic cause of his memory loss were unremarkable.     -he declines counseling, as he does not feel this would be effective.     -Neuropsych testing was completed in 5/2024, but I don't yet have his reports for review.     3. He takes Tylenol prn for chronic lumbar pain. He tried chiropractic care and PT prior.     4. He uses an OTC topical agent for neuropathic pain to his feet.     5. Insomnia is improved. He had worsening memory loss with consistent use, and only takes this rarely prn.   -Temazepam was stopped prior, due to cognitive issues.   -he failed Melatonin prior.     -He repeated his sleep study recently per Dr. Merino. He doesn't have a CPAP to use currently.   -Explained that untreated CECILE can contribute to memory loss.     6. He is followed by Cardiology-Dr. Anthony. He reports Carotid stenosis, not requiring surgery.    7. Neuropathy labs per orders. Suspect complaints to represent neuropathy.   He is currently taking Prednisone 5 mg each day for reasons, which are unclear. Will check A1c, as DM can contribute to neuropathy.   -if labs are unrevealing for cause of his neuropathy, will consider trial of Cymbalta to treat pain.   -he failed low dose Gabapentin. I would not increase his dose, due to his memory loss.   -he tried supplements prior-advised to message me with which supplements he has tried already.      FU 3 months/will call with results and any changes to the POC    Patient will need ONE HOUR visits    47 minutes of total time  spent on the encounter, which includes face to face time and non-face to face time preparing to see the patient (eg, review of tests), Obtaining and/or reviewing separately obtained history, Documenting clinical information in the electronic or other health record, Independently interpreting results (not separately reported) and communicating results to the patient/family/caregiver, or Care coordination (not separately reported).

## 2024-06-04 NOTE — PATIENT INSTRUCTIONS
Please send me a message when you get home to let me know which supplements you tried for your suspected neuropathy.     I will call you with labs results. If there is not anything in the lab results to explain your neuropathy, we could consider starting a trial of Cymbalta to treat your pain.

## 2024-06-05 ENCOUNTER — TELEPHONE (OUTPATIENT)
Dept: NEUROLOGY | Facility: CLINIC | Age: 84
End: 2024-06-05
Payer: MEDICARE

## 2024-06-05 LAB
ALBUMIN SERPL ELPH-MCNC: 3.56 G/DL (ref 3.35–5.55)
ALPHA1 GLOB SERPL ELPH-MCNC: 0.38 G/DL (ref 0.17–0.41)
ALPHA2 GLOB SERPL ELPH-MCNC: 0.87 G/DL (ref 0.43–0.99)
ANA SER QL IF: NORMAL
B-GLOBULIN SERPL ELPH-MCNC: 0.71 G/DL (ref 0.5–1.1)
GAMMA GLOB SERPL ELPH-MCNC: 0.68 G/DL (ref 0.67–1.58)
INTERPRETATION SERPL IFE-IMP: NORMAL
PATHOLOGIST INTERPRETATION IFE: NORMAL
PATHOLOGIST INTERPRETATION SPE: NORMAL
PROT SERPL-MCNC: 6.2 G/DL (ref 6–8.4)

## 2024-06-05 NOTE — TELEPHONE ENCOUNTER
I reviewed his memory testing. Results were overall normal. His memory loss is believed to be related to a combination of his medical issue and stress, as we discussed yesterday. '    His labs are not yet complete. We will contact him once complete to review.

## 2024-06-10 LAB — VIT B1 BLD-MCNC: 58 UG/L (ref 38–122)

## 2024-06-13 ENCOUNTER — PATIENT MESSAGE (OUTPATIENT)
Dept: NEUROLOGY | Facility: CLINIC | Age: 84
End: 2024-06-13
Payer: MEDICARE

## 2024-07-10 ENCOUNTER — TELEPHONE (OUTPATIENT)
Dept: DERMATOLOGY | Facility: CLINIC | Age: 84
End: 2024-07-10
Payer: MEDICARE

## 2024-07-17 ENCOUNTER — OFFICE VISIT (OUTPATIENT)
Dept: DERMATOLOGY | Facility: CLINIC | Age: 84
End: 2024-07-17
Payer: MEDICARE

## 2024-07-17 DIAGNOSIS — D22.9 NEVUS: ICD-10-CM

## 2024-07-17 DIAGNOSIS — L57.0 AK (ACTINIC KERATOSIS): Primary | ICD-10-CM

## 2024-07-17 DIAGNOSIS — L82.1 SK (SEBORRHEIC KERATOSIS): ICD-10-CM

## 2024-07-17 DIAGNOSIS — C79.31 MALIGNANT MELANOMA METASTATIC TO BRAIN: ICD-10-CM

## 2024-07-17 DIAGNOSIS — L01.00 IMPETIGO: ICD-10-CM

## 2024-07-17 DIAGNOSIS — L81.4 LENTIGO: ICD-10-CM

## 2024-07-17 DIAGNOSIS — D48.5 NEOPLASM OF UNCERTAIN BEHAVIOR OF SKIN: ICD-10-CM

## 2024-07-17 DIAGNOSIS — Z85.828 PERSONAL HISTORY OF SKIN CANCER: ICD-10-CM

## 2024-07-17 PROCEDURE — 11103 TANGNTL BX SKIN EA SEP/ADDL: CPT | Mod: PBBFAC,PO | Performed by: DERMATOLOGY

## 2024-07-17 PROCEDURE — 11102 TANGNTL BX SKIN SINGLE LES: CPT | Mod: PBBFAC,PO | Performed by: DERMATOLOGY

## 2024-07-17 PROCEDURE — 17000 DESTRUCT PREMALG LESION: CPT | Mod: S$PBB,XS,, | Performed by: DERMATOLOGY

## 2024-07-17 PROCEDURE — 11103 TANGNTL BX SKIN EA SEP/ADDL: CPT | Mod: S$PBB,,, | Performed by: DERMATOLOGY

## 2024-07-17 PROCEDURE — 99214 OFFICE O/P EST MOD 30 MIN: CPT | Mod: PBBFAC,PO,25 | Performed by: DERMATOLOGY

## 2024-07-17 PROCEDURE — 88305 TISSUE EXAM BY PATHOLOGIST: CPT | Mod: 59 | Performed by: DERMATOLOGY

## 2024-07-17 PROCEDURE — 17003 DESTRUCT PREMALG LES 2-14: CPT | Mod: S$PBB,,, | Performed by: DERMATOLOGY

## 2024-07-17 PROCEDURE — 17003 DESTRUCT PREMALG LES 2-14: CPT | Mod: PBBFAC,PO | Performed by: DERMATOLOGY

## 2024-07-17 PROCEDURE — 99204 OFFICE O/P NEW MOD 45 MIN: CPT | Mod: 25,S$PBB,, | Performed by: DERMATOLOGY

## 2024-07-17 PROCEDURE — 17000 DESTRUCT PREMALG LESION: CPT | Mod: PBBFAC,XS,PO | Performed by: DERMATOLOGY

## 2024-07-17 PROCEDURE — 99999 PR PBB SHADOW E&M-EST. PATIENT-LVL IV: CPT | Mod: PBBFAC,,, | Performed by: DERMATOLOGY

## 2024-07-17 PROCEDURE — 11102 TANGNTL BX SKIN SINGLE LES: CPT | Mod: S$PBB,,, | Performed by: DERMATOLOGY

## 2024-07-17 RX ORDER — MUPIROCIN 20 MG/G
OINTMENT TOPICAL
Qty: 30 G | Refills: 3 | Status: SHIPPED | OUTPATIENT
Start: 2024-07-17

## 2024-07-17 NOTE — PROGRESS NOTES
Subjective:      Patient ID:  Carl Hu is a 83 y.o. male who presents for   Chief Complaint   Patient presents with    Melanoma     MM clinic      Date of biopsy: 01/26/2023  Blackwell test  IIB  Location: L posterior Neck   Depth: 1.8 mm  LN: yes , negative - but brain mets detected on MRI   Date of excision: 03/2023    Pt has a history of  extensive  sun exposure in the past.   Pt recalls several blistering sunburns in the past:  few   Pt has history of tanning bed use: none   Pt has had atypical moles removed in the past: yes   Pt has personal history of breast cancer: none   Pt has history of melanoma in first degree relatives:  dad,  Pt has family history of pancreatic cancer: none   Pt is currently immunosuppressed: none- took Keytruda 063/2023-04/2024- d/c due to colitis    Chi Type: II    Last dental exam: January 2022  Last eye exam: January 2022      Pt stopped keytruda 06/2023-04/2024 and had severe colitis so has stopped the keytruda.  He is currently on steroids.   Pt present today with his daughter. - Pita Castanedaalfredo- [phone]- call her with reports    Pt c/o dry scaly bumps all over scaly and trunk. Not tender or bleeding.       Review of Systems   Constitutional:  Positive for weight loss and fatigue. Negative for fever, chills, night sweats and malaise.   HENT:  Negative for headaches.    Respiratory:  Positive for cough and shortness of breath.    Gastrointestinal:  Positive for nausea, diarrhea and indigestion.   Skin:  Positive for activity-related sunscreen use. Negative for daily sunscreen use, recent sunburn and wears hat.   Neurological:  Negative for headaches.   Hematologic/Lymphatic: Negative for adenopathy. Does not bruise/bleed easily.       Objective:   Physical Exam   Constitutional: He appears well-developed and well-nourished. No distress.   HENT:   Mouth/Throat: Gums normal.  Lips normal.  Normal dentition.   Eyes: Lids are normal.  No conjunctival no injection.    Musculoskeletal: Lymphadenopathy:      Cervical: No cervical adenopathy.      Upper Body:   No axillary adenopathy present.No supraclavicular adenopathy is present.      Lower Body:   No inguinal adenopathy.    Lymphadenopathy: No supraclavicular adenopathy is present.     He has no cervical adenopathy.     He has no axillary adenopathy.     He has no inguinal adenopathy.   Neurological: He is alert and oriented to person, place, and time. He is not disoriented.   Psychiatric: He has a normal mood and affect.   Skin:   Areas Examined (abnormalities noted in diagram):   Scalp / Hair Palpated and Inspected  Head / Face Inspection Performed  Neck Inspection Performed  Chest / Axilla Inspection Performed  Abdomen Inspection Performed  Genitals / Buttocks / Groin Inspection Performed  Back Inspection Performed  RUE Inspected  LUE Inspection Performed  RLE Inspected  LLE Inspection Performed  Nails and Digits Inspection Performed                           Diagram Legend     Erythematous scaling macule/papule c/w actinic keratosis       Vascular papule c/w angioma      Pigmented verrucoid papule/plaque c/w seborrheic keratosis      Yellow umbilicated papule c/w sebaceous hyperplasia      Irregularly shaped tan macule c/w lentigo     1-2 mm smooth white papules consistent with Milia      Movable subcutaneous cyst with punctum c/w epidermal inclusion cyst      Subcutaneous movable cyst c/w pilar cyst      Firm pink to brown papule c/w dermatofibroma      Pedunculated fleshy papule(s) c/w skin tag(s)      Evenly pigmented macule c/w junctional nevus     Mildly variegated pigmented, slightly irregular-bordered macule c/w mildly atypical nevus      Flesh colored to evenly pigmented papule c/w intradermal nevus       Pink pearly papule/plaque c/w basal cell carcinoma      Erythematous hyperkeratotic cursted plaque c/w SCC      Surgical scar with no sign of skin cancer recurrence      Open and closed comedones      Inflammatory  papules and pustules      Verrucoid papule consistent consistent with wart     Erythematous eczematous patches and plaques     Dystrophic onycholytic nail with subungual debris c/w onychomycosis     Umbilicated papule    Erythematous-base heme-crusted tan verrucoid plaque consistent with inflamed seborrheic keratosis     Erythematous Silvery Scaling Plaque c/w Psoriasis     See annotation      Assessment / Plan:      Pathology Orders:       Normal Orders This Visit    Specimen to Pathology, Dermatology     Questions:    Procedure Type: Dermatology and skin neoplasms    Number of Specimens: 2    ------------------------: -------------------------    Spec 1 Procedure: Biopsy    Spec 1 Clinical Impression: r/o BCC    Spec 1 Source: left superior ala    ------------------------: -------------------------    Spec 2 Procedure: Biopsy    Spec 2 Clinical Impression: r/o melanoma v  atypical nevus    Spec 2 Source: left upper back    Release to patient:           AK (actinic keratosis)  Cryosurgery Procedure Note    Verbal consent from the patient is obtained including, but not limited to, risk of hypopigmentation/hyperpigmentation, scar, recurrence of lesion. The patient is aware of the precancerous quality and need for treatment of these lesions. Liquid nitrogen cryosurgery is applied to the 2 actinic keratoses, as detailed in the physical exam, to produce a freeze injury. The patient is aware that blisters may form and is instructed on wound care with gentle cleansing and use of vaseline ointment to keep moist until healed. The patient is supplied a handout on cryosurgery and is instructed to call if lesions do not completely resolve.    -     Ambulatory referral/consult to Dermatology    Neoplasm of uncertain behavior of skin x 2   Shave biopsy procedure note:    Shave biopsy performed after verbal consent including risk of infection, scar, recurrence, need for additional treatment of site. Area prepped with alcohol,  anesthetized with approximately 1.0cc of 1% lidocaine with epinephrine. Lesional tissue shaved with razor blade. Hemostasis achieved with application of aluminum chloride followed by monsels on nose. No complications. Pressure Dressing applied. Wound care explained.    Consider referral to [facility] onc v pt would like to be seen in [facility] - call daughter sasha with results  -     Specimen to Pathology, Dermatology    SK (seborrheic keratosis)  These are benign inherited growths without a malignant potential. Reassurance given to patient. No treatment is necessary.     Nevus  Discussed ABCDE's of nevi.  Monitor for new mole or moles that are becoming bigger, darker, irritated, or developing irregular borders. Brochure provided. Instructed patient to observe lesion(s) for changes and follow up in clinic if changes are noted. Patient to monitor skin at home for new or changing lesions.     Impetigo  -     mupirocin (BACTROBAN) 2 % ointment; AAA bid  Dispense: 30 g; Refill: 3    Lentigo  This is a benign hyperpigmented sun induced lesion. Recommend daily sun protection/avoidance and use of at least SPF 30, broad spectrum sunscreen (OTC drug) will reduce the number of new lesions. Treatment of these benign lesions are considered cosmetic.    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 Site well healed with no signs of recurrence.  Stage IV     Total body skin examination performed today including at least 12 points as noted in physical examination. No lesions suspicious for malignancy noted.      Patient health maintenance:    Monthly Self Skin examinations as well as checking melanoma scar and lymph nodes drainage basin for abnormalities as discussed during the visit. [email or web address]://[email or web address]/[email or web address]/skin-cancer/[email or web address]  Regular dermatologic examinations with your board certified dermatologist   Need for sunscreen and sun protection- SPF 30  +, sun protective clothing, regular use of broad brim hat for outdoor exposure, avoidance of sun in peak hours, Heliocare anti-oxidant vitamin for prolonged sun exposure   Family member screening/risk for 1st degree relatives  Need for yearly checks by dentist and ophthalmology, and GYN if female  Remain up to date on age appropriate cancer screenings  Anti-inflammatory diet and no smoking- smoking can decrease survival of melanoma patients of any stage  Discussed lifestyle modifications that can improve outcomes for melanoma and other cancers including healthy diet, fostering good relationships, stress management, regular exercise, sleeping well, minimizing alcohol intake.       Referral to : possibly- daughter to discuss with PCP as 2 sisters with BRCA, unsure if they had genetic testing   Referral to hematology/oncology: yes  Need for radiologic follow up yes  Referral for Total Body Photography no  60 minutes spent reviewing patients chart including all previous pathology reports pertaining to melanoma, surgical excision report, initial surgical consult, previous mole removals, and discussing all of the above with the patient.     F/U for TBSE 3 months           Follow up for prn bx report. TBSE 3months with Dr Davies

## 2024-07-17 NOTE — PATIENT INSTRUCTIONS
Your doctor performed a skin biopsy today as she is concerned the lesion may be a skin cancer. Skin cancer is different than other cancers in that if it is addressed early, it can be cured with skin surgery.  As discussed at your appointment, if the lesion is a skin cancer your will be referred for Mohs Surgery.  Your pathology report, photos, and note will be messaged to your Mohs Department. Mohs surgery is a dermatologic subspecialty.  The surgeon is specially trained to remove the cancerous tissue, process it under the microscope while you wait, and ensure the skin cancer is removed with clear margins. The surgeon is then trained to repair the defect in a cosmetically acceptable way.  This is the standard of care for many skin cancers and [facility] has an excellent team that works with our dermatologists to offer this service.      F/u 3months for skin check with Dr Davies

## 2024-07-17 NOTE — Clinical Note
Hi- pt would like to know when he is due for additional scans/brain MRI. Your staff can contact his marioughjesus Lema. Thanks STEPHANIE

## 2024-07-19 ENCOUNTER — TELEPHONE (OUTPATIENT)
Dept: DERMATOLOGY | Facility: CLINIC | Age: 84
End: 2024-07-19
Payer: MEDICARE

## 2024-07-19 NOTE — TELEPHONE ENCOUNTER
----- Message from Ghulam Altman MD sent at 7/18/2024  9:46 AM CDT -----  Happy to schedule him with us for scans if that is what he wants. Including Blake to help with scheduling. MRI brain is already scheduled with Dr. Nava, but PETCT would also be due.  ----- Message -----  From: Medina Amaral MD  Sent: 7/17/2024  10:17 PM CDT  To: Ghulam Altman MD; [facility] Staff    We can reach out to him tomorrow and see. He seemed to think he would do it here. We will find out.     Staff- can you call pt daughter sasha and see about the below? MRI at [facility].     thanks  ----- Message -----  From: Ghulam Altman MD  Sent: 7/17/2024   3:43 PM CDT  To: Medina Amaral MD    I thought he was following with Dr. Hoskins in [facility].  ----- Message -----  From: Medina Amaral MD  Sent: 7/17/2024  12:13 PM CDT  To: Ghulam Altman MD    Hi- pt would like to know when he is due for additional scans/brain MRI. Your staff can contact his daugher Sasha. Thanks STEPHANIE

## 2024-07-22 LAB
FINAL PATHOLOGIC DIAGNOSIS: NORMAL
GROSS: NORMAL
Lab: NORMAL
MICROSCOPIC EXAM: NORMAL

## 2024-07-31 ENCOUNTER — DOCUMENTATION ONLY (OUTPATIENT)
Dept: CARDIOLOGY | Facility: HOSPITAL | Age: 84
End: 2024-07-31
Payer: MEDICARE

## 2024-08-13 ENCOUNTER — PATIENT MESSAGE (OUTPATIENT)
Dept: DERMATOLOGY | Facility: CLINIC | Age: 84
End: 2024-08-13
Payer: MEDICARE

## 2024-08-20 ENCOUNTER — TELEPHONE (OUTPATIENT)
Dept: DERMATOLOGY | Facility: CLINIC | Age: 84
End: 2024-08-20
Payer: MEDICARE

## 2024-08-21 ENCOUNTER — TELEPHONE (OUTPATIENT)
Dept: DERMATOLOGY | Facility: CLINIC | Age: 84
End: 2024-08-21
Payer: MEDICARE

## 2024-08-21 NOTE — TELEPHONE ENCOUNTER
----- Message from Valeria Parker sent at 8/20/2024  3:08 PM CDT -----  Regarding: call Back  Contact: Pita [phone]  Pita/ daughter is calling to speak to you please call

## 2024-08-22 ENCOUNTER — TELEPHONE (OUTPATIENT)
Dept: DERMATOLOGY | Facility: CLINIC | Age: 84
End: 2024-08-22
Payer: MEDICARE

## 2024-08-22 NOTE — TELEPHONE ENCOUNTER
----- Message from Rachelle Joseph sent at 8/22/2024  8:31 AM CDT -----  Regarding: Results  Contact: Pt@[phone]  Pt is calling to speak to someone in the office to discuss test results. Pt is asking for a return call soon. Thanks.         Who Called: Pt         Test Results for:  to discuss biopsy         Best call back number:  [phone]

## 2024-08-22 NOTE — TELEPHONE ENCOUNTER
Spoke with pt's daughter, Pita. Informed her a message will be sent over to provider. Verb understanding.     ----- Message from Ney Murphy sent at 8/22/2024  2:55 PM CDT -----  Regarding: return call  PATIENT RETURNING CALL    Pt's daughter Pita returned Ghazala's call regarding bx results. Please call pt at [phone]

## 2024-08-26 ENCOUNTER — DOCUMENTATION ONLY (OUTPATIENT)
Dept: CARDIOLOGY | Facility: HOSPITAL | Age: 84
End: 2024-08-26
Payer: MEDICARE

## 2024-08-26 NOTE — PROGRESS NOTES
Patient has been identified as having an implanted cardiac rhythm device (CRD); the implanted device is a Medtronic defibrillator.      No noted pacer dependency.    Documented underlying rhythm c/w NSR @ 80bpm       Per protocol,a magnet should be applied directly over the implanted device during entire surgical procedure when electrocautery will be used.    If no electrocautery is planned, magnet application is not needed.    For additional questions, please contact the Arrhythmia Department at Ext [phone].

## 2024-09-02 ENCOUNTER — PATIENT MESSAGE (OUTPATIENT)
Dept: DERMATOLOGY | Facility: CLINIC | Age: 84
End: 2024-09-02
Payer: MEDICARE

## 2024-09-05 ENCOUNTER — PATIENT MESSAGE (OUTPATIENT)
Dept: DERMATOLOGY | Facility: CLINIC | Age: 84
End: 2024-09-05
Payer: MEDICARE

## 2024-10-22 DIAGNOSIS — L01.00 IMPETIGO: ICD-10-CM

## 2024-10-22 NOTE — TELEPHONE ENCOUNTER
Please see the attached refill request.    Last seen 07/2024    Assessment / Plan:      Pathology Orders:         Normal Orders This Visit     Specimen to Pathology, Dermatology      Questions:     Procedure Type: Dermatology and skin neoplasms     Number of Specimens: 2     ------------------------: -------------------------     Spec 1 Procedure: Biopsy     Spec 1 Clinical Impression: r/o BCC     Spec 1 Source: left superior ala     ------------------------: -------------------------     Spec 2 Procedure: Biopsy     Spec 2 Clinical Impression: r/o melanoma v  atypical nevus     Spec 2 Source: left upper back     Release to patient:              AK (actinic keratosis)  Cryosurgery Procedure Note     Verbal consent from the patient is obtained including, but not limited to, risk of hypopigmentation/hyperpigmentation, scar, recurrence of lesion. The patient is aware of the precancerous quality and need for treatment of these lesions. Liquid nitrogen cryosurgery is applied to the 2 actinic keratoses, as detailed in the physical exam, to produce a freeze injury. The patient is aware that blisters may form and is instructed on wound care with gentle cleansing and use of vaseline ointment to keep moist until healed. The patient is supplied a handout on cryosurgery and is instructed to call if lesions do not completely resolve.     -     Ambulatory referral/consult to Dermatology     Neoplasm of uncertain behavior of skin x 2   Shave biopsy procedure note:     Shave biopsy performed after verbal consent including risk of infection, scar, recurrence, need for additional treatment of site. Area prepped with alcohol, anesthetized with approximately 1.0cc of 1% lidocaine with epinephrine. Lesional tissue shaved with razor blade. Hemostasis achieved with application of aluminum chloride followed by monsels on nose. No complications. Pressure Dressing applied. Wound care explained.     Consider referral to [facility] onc v pt  would like to be seen in [facility] - call daughter sasha with results  -     Specimen to Pathology, Dermatology     SK (seborrheic keratosis)  These are benign inherited growths without a malignant potential. Reassurance given to patient. No treatment is necessary.      Nevus  Discussed ABCDE's of nevi.  Monitor for new mole or moles that are becoming bigger, darker, irritated, or developing irregular borders. Brochure provided. Instructed patient to observe lesion(s) for changes and follow up in clinic if changes are noted. Patient to monitor skin at home for new or changing lesions.      Impetigo  -     mupirocin (BACTROBAN) 2 % ointment; AAA bid  Dispense: 30 g; Refill: 3     Lentigo  This is a benign hyperpigmented sun induced lesion. Recommend daily sun protection/avoidance and use of at least SPF 30, broad spectrum sunscreen (OTC drug) will reduce the number of new lesions. Treatment of these benign lesions are considered cosmetic.     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 Site well healed with no signs of recurrence.  Stage IV      Total body skin examination performed today including at least 12 points as noted in physical examination. No lesions suspicious for malignancy noted.        Patient health maintenance:     Monthly Self Skin examinations as well as checking melanoma scar and lymph nodes drainage basin for abnormalities as discussed during the visit. [email or web address]  Regular dermatologic examinations with your board certified dermatologist   Need for sunscreen and sun protection- SPF 30 +, sun protective clothing, regular use of broad brim hat for outdoor exposure, avoidance of sun in peak hours, Heliocare anti-oxidant vitamin for prolonged sun exposure   Family member screening/risk for 1st degree relatives  Need for yearly checks by dentist and ophthalmology, and GYN if  female  Remain up to date on age appropriate cancer screenings  Anti-inflammatory diet and no smoking- smoking can decrease survival of melanoma patients of any stage  Discussed lifestyle modifications that can improve outcomes for melanoma and other cancers including healthy diet, fostering good relationships, stress management, regular exercise, sleeping well, minimizing alcohol intake.         Referral to : possibly- daughter to discuss with PCP as 2 sisters with BRCA, unsure if they had genetic testing   Referral to hematology/oncology: yes  Need for radiologic follow up yes  Referral for Total Body Photography no  60 minutes spent reviewing patients chart including all previous pathology reports pertaining to melanoma, surgical excision report, initial surgical consult, previous mole removals, and discussing all of the above with the patient.      F/U for TBSE 3 months           Follow up for prn bx report. TBSE 3months with Dr Davies

## 2024-10-23 RX ORDER — MUPIROCIN 20 MG/G
OINTMENT TOPICAL
Qty: 22 G | Refills: 0 | Status: SHIPPED | OUTPATIENT
Start: 2024-10-23

## 2024-10-24 ENCOUNTER — TELEPHONE (OUTPATIENT)
Dept: DERMATOLOGY | Facility: CLINIC | Age: 84
End: 2024-10-24
Payer: MEDICARE

## 2024-10-24 DIAGNOSIS — G47.00 INSOMNIA, UNSPECIFIED TYPE: ICD-10-CM

## 2024-10-28 ENCOUNTER — TELEPHONE (OUTPATIENT)
Dept: DERMATOLOGY | Facility: CLINIC | Age: 84
End: 2024-10-28
Payer: MEDICARE

## 2024-10-29 ENCOUNTER — PROCEDURE VISIT (OUTPATIENT)
Dept: DERMATOLOGY | Facility: CLINIC | Age: 84
End: 2024-10-29
Payer: MEDICARE

## 2024-10-29 VITALS
BODY MASS INDEX: 33.08 KG/M2 | SYSTOLIC BLOOD PRESSURE: 87 MMHG | HEART RATE: 66 BPM | WEIGHT: 236.31 LBS | HEIGHT: 71 IN | DIASTOLIC BLOOD PRESSURE: 57 MMHG

## 2024-10-29 DIAGNOSIS — C44.311 BASAL CELL CARCINOMA OF LEFT ALA NASI: Primary | ICD-10-CM

## 2024-10-29 PROCEDURE — 17311 MOHS 1 STAGE H/N/HF/G: CPT | Mod: S$PBB,51,, | Performed by: DERMATOLOGY

## 2024-10-29 PROCEDURE — 99499 UNLISTED E&M SERVICE: CPT | Mod: S$PBB,,, | Performed by: DERMATOLOGY

## 2024-10-29 PROCEDURE — 17312 MOHS ADDL STAGE: CPT | Mod: S$PBB,,, | Performed by: DERMATOLOGY

## 2024-10-29 PROCEDURE — 17312 MOHS ADDL STAGE: CPT | Mod: PBBFAC | Performed by: DERMATOLOGY

## 2024-10-29 PROCEDURE — 17311 MOHS 1 STAGE H/N/HF/G: CPT | Mod: PBBFAC | Performed by: DERMATOLOGY

## 2024-10-29 PROCEDURE — 14060 TIS TRNFR E/N/E/L 10 SQ CM/<: CPT | Mod: PBBFAC | Performed by: DERMATOLOGY

## 2024-10-29 PROCEDURE — 14060 TIS TRNFR E/N/E/L 10 SQ CM/<: CPT | Mod: S$PBB,,, | Performed by: DERMATOLOGY

## 2024-10-29 RX ORDER — CEPHALEXIN 500 MG/1
500 CAPSULE ORAL 3 TIMES DAILY
Qty: 21 CAPSULE | Refills: 0 | Status: SHIPPED | OUTPATIENT
Start: 2024-10-29 | End: 2024-11-05

## 2024-10-29 RX ORDER — TRAZODONE HYDROCHLORIDE 50 MG/1
50 TABLET ORAL NIGHTLY
Qty: 30 TABLET | Refills: 0 | Status: SHIPPED | OUTPATIENT
Start: 2024-10-29

## 2024-11-05 ENCOUNTER — OFFICE VISIT (OUTPATIENT)
Dept: DERMATOLOGY | Facility: CLINIC | Age: 84
End: 2024-11-05
Payer: MEDICARE

## 2024-11-05 DIAGNOSIS — Z09 POSTOP CHECK: Primary | ICD-10-CM

## 2024-11-05 PROCEDURE — 99024 POSTOP FOLLOW-UP VISIT: CPT | Mod: POP,,, | Performed by: DERMATOLOGY

## 2024-11-05 PROCEDURE — 99212 OFFICE O/P EST SF 10 MIN: CPT | Mod: PBBFAC | Performed by: DERMATOLOGY

## 2024-11-05 PROCEDURE — 99999 PR PBB SHADOW E&M-EST. PATIENT-LVL II: CPT | Mod: PBBFAC,,, | Performed by: DERMATOLOGY

## 2024-11-05 NOTE — PROGRESS NOTES
83 y.o. male patient is here for suture removal following Mohs' surgery.    Patient reports no problems.    WOUND PE:  The left superior ala sutures intact. Wound healing well. Good skin edges. No signs or symptoms of infection.    IMPRESSION:  Healing operative site from Mohs' surgery BCC left superior ala s/p Mohs with Perialar Crescentic Advancement Flap with partial second intention, postop day #7.    PLAN:  Sutures removed today by  Caroline Guido MA . Steri-strips applied.  Continue wound care.  Keep moist with Aquaphor.    RTC:  In 1 month.

## 2024-12-04 ENCOUNTER — OFFICE VISIT (OUTPATIENT)
Dept: NEUROLOGY | Facility: CLINIC | Age: 84
End: 2024-12-04
Payer: MEDICARE

## 2024-12-04 VITALS
HEART RATE: 71 BPM | RESPIRATION RATE: 16 BRPM | DIASTOLIC BLOOD PRESSURE: 70 MMHG | OXYGEN SATURATION: 97 % | WEIGHT: 242.63 LBS | SYSTOLIC BLOOD PRESSURE: 104 MMHG | HEIGHT: 71 IN | BODY MASS INDEX: 33.97 KG/M2

## 2024-12-04 DIAGNOSIS — M79.672 FOOT PAIN, BILATERAL: ICD-10-CM

## 2024-12-04 DIAGNOSIS — D49.6 BRAIN TUMOR: ICD-10-CM

## 2024-12-04 DIAGNOSIS — Z92.3 HX OF RADIATION THERAPY: ICD-10-CM

## 2024-12-04 DIAGNOSIS — R41.3 MEMORY LOSS: ICD-10-CM

## 2024-12-04 DIAGNOSIS — M79.671 FOOT PAIN, BILATERAL: ICD-10-CM

## 2024-12-04 DIAGNOSIS — C79.31 MALIGNANT MELANOMA METASTATIC TO BRAIN: Primary | ICD-10-CM

## 2024-12-04 DIAGNOSIS — G47.33 OSA (OBSTRUCTIVE SLEEP APNEA): ICD-10-CM

## 2024-12-04 PROCEDURE — 99999 PR STA SHADOW: CPT | Mod: PBBFAC,,, | Performed by: NURSE PRACTITIONER

## 2024-12-04 PROCEDURE — 99214 OFFICE O/P EST MOD 30 MIN: CPT | Mod: PBBFAC | Performed by: NURSE PRACTITIONER

## 2024-12-04 PROCEDURE — 99214 OFFICE O/P EST MOD 30 MIN: CPT | Mod: S$PBB | Performed by: NURSE PRACTITIONER

## 2024-12-04 PROCEDURE — 99999 PR PBB SHADOW E&M-EST. PATIENT-LVL IV: CPT | Mod: PBBFAC,,, | Performed by: NURSE PRACTITIONER

## 2024-12-04 RX ORDER — AMIODARONE HYDROCHLORIDE 200 MG/1
TABLET ORAL
COMMUNITY
Start: 2024-10-14

## 2024-12-04 RX ORDER — METOPROLOL SUCCINATE 100 MG/1
100 TABLET, EXTENDED RELEASE ORAL 2 TIMES DAILY
COMMUNITY
Start: 2024-10-10

## 2024-12-04 RX ORDER — AMITRIPTYLINE HYDROCHLORIDE 25 MG/1
25 TABLET, FILM COATED ORAL
COMMUNITY
Start: 2024-11-16

## 2024-12-04 RX ORDER — SACUBITRIL AND VALSARTAN 24; 26 MG/1; MG/1
1 TABLET, FILM COATED ORAL 2 TIMES DAILY
COMMUNITY
Start: 2024-10-01

## 2024-12-04 RX ORDER — HYDROCODONE BITARTRATE AND ACETAMINOPHEN 7.5; 325 MG/1; MG/1
1 TABLET ORAL 2 TIMES DAILY
COMMUNITY
Start: 2024-11-18

## 2024-12-04 RX ORDER — ALLOPURINOL 100 MG/1
TABLET ORAL
COMMUNITY
Start: 2024-11-29

## 2024-12-04 RX ORDER — EVOLOCUMAB 140 MG/ML
140 INJECTION, SOLUTION SUBCUTANEOUS
COMMUNITY
Start: 2024-09-10

## 2024-12-04 NOTE — PROGRESS NOTES
Subjective:      Chief Complaint:  Neurologic Problem (6 month follow up.)      History of Present Illness  Carl Hu is a 84 y.o. 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 He has had radiation therapy for both. He has HTN and HLD, CECILE, as well as a history of lumbar radicular pain (evaluated in clinic in 2016 for this). History of surgery to right knee and left knee injury.     He presents today for a follow up visit. He is here today with his daughter. Neuropsych testing in 6/2024 was normal.     Labs were done at his last visit to evaluate for systemic contributions to his neuropathic complaints.     He would like to try medication for his neuropathy complaints. He is hesitant to try Cymbalta, as he had a manic reaction to antidepressants prior. He was on steroids also at the time.     He has tried L-glutamine, Alpha lipoic acid, and B Complex.     He has mild relief with L-carnitine. He tried his wife's Rx neuropathic compound cream prior.     Gabapentin and Lyrica were ineffective.     He needs to schedule his updated MRI Brain and PET-CT. He is requesting this clinic to schedule this for Dr. Nava.     Memory complaints are improved since his last visit. He stopped driving as a precaution.     He had a stem cell injection for his lumbar pain. He sees Dr. Isbell/pain management.     He is not compliant with CPAP for his central CECILE. He was given a pamphlet on Inspire device, which he will consider.     I have reviewed all of this patient's past medical and surgical histories as well as family and social histories and active allergies and medications as documented in the electronic medical record.    Review of Systems  Review of Systems   Constitutional:  Negative for activity change, appetite change, fatigue, fever and unexpected weight change.   HENT:  Positive for hearing  loss. Negative for congestion, drooling, ear pain, mouth sores, sinus pressure, tinnitus, trouble swallowing and voice change.    Eyes:  Negative for photophobia and visual disturbance.        Blurred vision at times   Respiratory:  Negative for apnea, choking and shortness of breath.    Cardiovascular:  Negative for chest pain, palpitations and leg swelling.   Gastrointestinal:  Negative for constipation, diarrhea, nausea and vomiting.   Endocrine: Negative.    Genitourinary:  Negative for dysuria.   Musculoskeletal:  Positive for back pain. Negative for arthralgias, gait problem, joint swelling, myalgias, neck pain and neck stiffness.   Skin:  Negative for rash and wound.   Neurological:  Positive for numbness. Negative for dizziness, tremors, seizures, syncope, facial asymmetry, speech difficulty, weakness, light-headedness and headaches.        Pain and tingling to feet   Hematological:  Does not bruise/bleed easily.   Psychiatric/Behavioral:  Negative for agitation, behavioral problems, confusion, decreased concentration, dysphoric mood, hallucinations, sleep disturbance and suicidal ideas. The patient is not nervous/anxious.         Memory loss       Objective:       Vitals:    12/04/24 1304   BP: 104/70   Pulse: 71   Resp: 16     Exam:  Gen Appearance, well developed/nourished in no apparent distress  CV: 2+ distal pulses with no edema or swelling  Neuro:  MS: Awake, alert, oriented to place, person, time, situation. Sustains attention. Recent/remote memory intact, Language is full to spontaneous speech/repetition/naming/comprehension. Fund of Knowledge is full. Speech is tangential, which is his baseline. Clock drawing is excellent today.   CN: PERRL, Extraoccular movements and visual fields are full. Normal facial sensation and strength, Hearing symmetric, Tongue and Palate are midline and strong. Shoulder Shrug symmetric and strong.  Motor: Normal bulk, tone, no abnormal movements. 5/5 strength bilateral  upper/lower extremities with 2+ reflexes and bilateral plantar response  Sensory: symmetric to light touch, pain, temp, and vibration/proprioception. Romberg negative  Cerebellar: Finger-nose, Heal-shin, Rapid alternating movements intact  Gait: Normal stance, no ataxia, but gait is arthralgic    Imaging:  MRI Brain 5/8/24:  MRI brain: Relatively stable subcentimeter region of enhancement within the left occipital lobe with superimposed T2 flair signal hyperintensity suggestive for evolving post treatment change with solitary metastases     No evidence for significant new signal abnormality or new enhancing lesion to suggest metastatic disease progression.     Scattered T2 FLAIR signal abnormality elsewhere supratentorial white matter which is nonspecific and may represent mild chronic ischemic change     MR perfusion: No abnormal elevated relative cerebral blood volume associated with the left cerebellar lesion allowing for susceptibility artifact likely from post treatment change.     MRI Brain 2/2024:  There has been apparent development of some tiny enhancing foci scattered throughout the brain parenchyma consistent with developing metastasis. These are less than 3 mm, and are too small to be definitively characterized.     MRI Brain with and without contrast in 4/2023 showed a metastatic lesion within the cortex of the medial left occipital lobe, measuring 9 x 12 mm.      Review of records from [facility]iology:   Echo 5/2023 showed an EF of 35%, PAP 26 mmHg, pacemaker lead present. Other findings were mild.     5/2023 Echo per [facility]:   EF 35%    2016 MRI L[facility]spine done at [facility] (in media):   Mild stenosis at multiple levels    Labs:  6/2023 CBC, CMP, TSh, T4, B12, Ferritin, folate, RPR overall unremarkable  6/2024 Memory/neuropathy labs unremarkable    Assessment:      1. 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 Brain tumor    3. CECILE (obstructive sleep apnea)    4. Memory loss    5. Hx of radiation therapy         Plan:   I recommend:   MRI Brain with and without contrast in 4/2023 showed a metastatic lesion within the cortex of the medial left occipital lobe, measuring 9 x 12 mm.   -MRI Brain update in 2/2024 suggested metastasis; however, this was not appreciated by Neuro-Oncology at [facility], who he is now seeing. MRI Brain update in 5/2024 failed to demonstrated any significant changes.     -His skin cancer is being managed by [facility] oncology/Dermatology.   -currently receiving radiation for his skin cancer.     2. He has short term memory complaints, which began around the time of his skin cancer/brain tumor diagnosis. Possible contributions are situational anxiety/depression, untreated CECILE (no longer using CPAP), heart failure/reduced EF, recent radiation therapy, and brain tumor treated with radiation.   -labs to evaluate for systemic cause of his memory loss were unremarkable.     -he declines counseling, as he does not feel this would be effective.     -Neuropsych testing was completed in 5/2024, and this was normal.     3. He takes Tylenol prn for chronic lumbar pain. He tried chiropractic care and PT prior.     4. He uses an OTC topical agent for neuropathic pain to his feet.     5. Insomnia is improved. He had worsening memory loss with consistent use, and only takes this rarely prn.   -Temazepam was stopped prior, due to cognitive issues.   -he failed Melatonin prior, as well as Trazodone.    -He repeated his sleep study recently per Dr. Merino. He doesn't have a CPAP to use currently.   -Explained that untreated CECILE can contribute to memory loss.     6. He is followed by Cardiology. He reports Carotid stenosis, not requiring surgery.    7. Neuropathy labs overall unremarkable, aside from borderline pre-DM.   -for his neuropathic complaints, he has tried and failed Lyrica, Gabapentin, B Complex, and L-glutamine.   -he declines trial of Cymbalta, as antidepressants were poorly tolerated prior.   -he is taking Elavil for  insomnia without relief of his neuropathy.   -he has mild relief with L-carnitine    -PVD ruled out by Cardiology.     -start Rx compound cream. He had relief with his wife's Rx prior.     FU 6 months    Patient will need ONE HOUR visits

## 2025-01-10 ENCOUNTER — HOSPITAL ENCOUNTER (OUTPATIENT)
Dept: RADIOLOGY | Facility: HOSPITAL | Age: 85
Discharge: HOME | End: 2025-01-10
Attending: INTERNAL MEDICINE
Payer: MEDICARE

## 2025-01-10 DIAGNOSIS — D49.6 BRAIN TUMOR: ICD-10-CM

## 2025-01-10 LAB — POCT GLUCOSE: 96 MG/DL (ref 70–110)

## 2025-01-10 PROCEDURE — A9552 F18 FDG: HCPCS | Performed by: INTERNAL MEDICINE

## 2025-01-10 PROCEDURE — 78816 PET IMAGE W/CT FULL BODY: CPT | Mod: 26,PS,, | Performed by: NUCLEAR MEDICINE

## 2025-01-10 PROCEDURE — 78816 PET IMAGE W/CT FULL BODY: CPT | Mod: TC

## 2025-01-10 RX ORDER — FLUDEOXYGLUCOSE F18 500 MCI/ML
12 INJECTION INTRAVENOUS
Status: COMPLETED | OUTPATIENT
Start: 2025-01-10 | End: 2025-01-10

## 2025-01-10 RX ADMIN — FLUDEOXYGLUCOSE F-18 11.74 MILLICURIE: 500 INJECTION INTRAVENOUS at 01:01

## 2025-02-05 ENCOUNTER — PATIENT MESSAGE (OUTPATIENT)
Dept: NEUROLOGY | Facility: CLINIC | Age: 85
End: 2025-02-05
Payer: MEDICARE

## 2025-02-05 ENCOUNTER — PATIENT MESSAGE (OUTPATIENT)
Dept: HEMATOLOGY/ONCOLOGY | Facility: CLINIC | Age: 85
End: 2025-02-05
Payer: MEDICARE

## 2025-02-06 ENCOUNTER — TELEPHONE (OUTPATIENT)
Facility: CLINIC | Age: 85
End: 2025-02-06
Payer: MEDICARE

## 2025-02-06 NOTE — TELEPHONE ENCOUNTER
Called pt's daughter to try to schedule follow-up appointment and MRI. She did not answer. LVM instructing to call back and left call back information.

## 2025-02-06 NOTE — TELEPHONE ENCOUNTER
Called pt to schedule MRI and follow-up appointment. Pt did not answer. LVM with call back information.

## 2025-02-10 ENCOUNTER — TELEPHONE (OUTPATIENT)
Facility: CLINIC | Age: 85
End: 2025-02-10
Payer: MEDICARE

## 2025-02-10 NOTE — TELEPHONE ENCOUNTER
Attempted to reach pt's daughter to schedule MRI and follow up appointment for Mr. HuYane SANTILLAN with call back information.

## 2025-02-10 NOTE — TELEPHONE ENCOUNTER
2nd attempt to call patient to make follow-up with Dr. Nava and MRI appointment. LVM with call back information.

## 2025-02-26 ENCOUNTER — TELEPHONE (OUTPATIENT)
Dept: HEMATOLOGY/ONCOLOGY | Facility: CLINIC | Age: 85
End: 2025-02-26
Payer: MEDICARE

## 2025-02-26 NOTE — TELEPHONE ENCOUNTER
Spoke to pt and pt is requesting an appt with Dr. Altman to discuss his PET scan results and to get another prednisone prescription since Dr. Altman gave him prednisone at the last appt. Pt also expressed some concern about his brain MRI and whether they would be able to do it with his defibrillator and pacemaker combo. Pt also requested that I reach out to Dr. Nava's office to see if he could be seen by Dr. Nava on the same day as she would be seen by Dr. Altman.   ----- Message from Margo sent at 2/26/2025  1:37 PM CST -----  Regarding: appt req  Contact: Pt  Type: Appointment RequestCaller is requesting an appointment Name of Caller: PtReason for appointment: Check UpWould the patient rather a call back or a response via [facility]? Call Omari Call Back Number:  [phone] Information: Please call, Thank You   no concerns

## 2025-02-28 ENCOUNTER — PATIENT MESSAGE (OUTPATIENT)
Facility: CLINIC | Age: 85
End: 2025-02-28
Payer: MEDICARE

## 2025-03-05 DIAGNOSIS — C43.9 METASTATIC MELANOMA: Primary | ICD-10-CM

## 2025-03-06 ENCOUNTER — DOCUMENTATION ONLY (OUTPATIENT)
Dept: CARDIOLOGY | Facility: HOSPITAL | Age: 85
End: 2025-03-06
Payer: MEDICARE

## 2025-03-06 NOTE — PROGRESS NOTES
Received a call/message from Patti in the MRI Department in relation to this patient needing to be scheduled for a MRI and has a [employer]onic ICD/Pacemaker.  Patient's Device is MRI compatible/conditional.  To meet protocol the Pacemaker/ICD must have been implanted no less than 6 weeks prior to scheduled date of Scan.  ICD/PPM and leads must be from same .  MRI informed ordering MD must input a Cardiac Device Check in clinic and hospital order, patient must have an xray within 6 months or less prior to MRI reprogramming.  Chest xray to be reviewed by Radiologist.       MRI is schedule on March 25th at 12:00 p.m.   Device rep has agreed to be available for the MRI    Please call [phone] for reprogramming parameters.    ICD- [id number] Evelyn Mri XT Serial# [id number]   Ra Medtronic 5076-52 serial# [id number]   RV Med[employer]onic  6935M-62 Serial# [id number]   Implant 6/6/2018

## 2025-03-07 ENCOUNTER — TELEPHONE (OUTPATIENT)
Dept: CARDIOLOGY | Facility: HOSPITAL | Age: 85
End: 2025-03-07
Payer: MEDICARE

## 2025-03-07 NOTE — TELEPHONE ENCOUNTER
Ava with [employer] has been informed that the MRI scheduled has been canceled by the patient.  ----- Message from Mitzi Armstrong sent at 3/7/2025 12:52 PM CST -----  Regarding: RE: Mri W/Medtronic pacemaker  Hi June Just happen to noticed ptCanceled let Rep know Sawyer  ----- Message -----  From: Dee Box  Sent: 3/6/2025   2:35 PM CST  To: RT Rubi  Subject: RE: Mri W/Medtronic pacemaker                    Okay for MRI date and time.  ----- Message -----  From: Patti Brandt RT  Sent: 3/5/2025  11:38 AM CST  To: RT Shayy; [facility] Arrhythmia#  Subject: Mri W/Medtronic pacemaker                        Good MorningChris okay pt Carl Hu to be schedule for March 25 at 12:00 p.m.This is his info:ICD- [id number] Evelyn Mri XT Serial# [id number] Medtronic 5076-52 serial# [id number] [employer]onic  6935M-62 Serial# [id number] 6/6/2018Please let me knowThank you

## 2025-03-14 ENCOUNTER — RESULTS FOLLOW-UP (OUTPATIENT)
Dept: HEMATOLOGY/ONCOLOGY | Facility: CLINIC | Age: 85
End: 2025-03-14